# Patient Record
Sex: FEMALE | Race: WHITE | NOT HISPANIC OR LATINO | Employment: FULL TIME | URBAN - METROPOLITAN AREA
[De-identification: names, ages, dates, MRNs, and addresses within clinical notes are randomized per-mention and may not be internally consistent; named-entity substitution may affect disease eponyms.]

---

## 2023-02-24 PROBLEM — J67.9 HYPERSENSITIVITY PNEUMONITIS (MULTI): Status: ACTIVE | Noted: 2023-02-24

## 2023-02-24 PROBLEM — Z98.890 S/P LASIK (LASER ASSISTED IN SITU KERATOMILEUSIS): Status: ACTIVE | Noted: 2023-02-24

## 2023-02-24 PROBLEM — L71.9 BLEPHARITIS WITH ROSACEA: Status: ACTIVE | Noted: 2023-02-24

## 2023-02-24 PROBLEM — H53.71 GLARE SENSITIVITY: Status: ACTIVE | Noted: 2023-02-24

## 2023-02-24 PROBLEM — B02.33 HERPES ZOSTER KERATITIS OF RIGHT EYE: Status: ACTIVE | Noted: 2023-02-24

## 2023-02-24 PROBLEM — E66.3 OVERWEIGHT WITH BODY MASS INDEX (BMI) OF 27 TO 27.9 IN ADULT: Status: ACTIVE | Noted: 2023-02-24

## 2023-02-24 PROBLEM — R92.30 BREAST DENSITY: Status: ACTIVE | Noted: 2023-02-24

## 2023-02-24 PROBLEM — H01.009 BLEPHARITIS WITH ROSACEA: Status: ACTIVE | Noted: 2023-02-24

## 2023-02-24 RX ORDER — ESTRADIOL 10 UG/1
INSERT VAGINAL
COMMUNITY
Start: 2018-07-24 | End: 2024-01-30 | Stop reason: SDUPTHER

## 2023-02-24 RX ORDER — QUINIDINE GLUCONATE 324 MG
TABLET, EXTENDED RELEASE ORAL
COMMUNITY
Start: 2014-06-05

## 2023-02-24 RX ORDER — CALCIUM CARBONATE 500(1250)
TABLET ORAL
COMMUNITY
Start: 2014-06-05

## 2023-03-15 LAB
BASOPHILS (10*3/UL) IN BLOOD BY AUTOMATED COUNT: 0.05 X10E9/L (ref 0–0.1)
BASOPHILS/100 LEUKOCYTES IN BLOOD BY AUTOMATED COUNT: 0.7 % (ref 0–2)
CHOLESTEROL (MG/DL) IN SER/PLAS: 218 MG/DL (ref 0–199)
CHOLESTEROL IN HDL (MG/DL) IN SER/PLAS: 60.9 MG/DL
CHOLESTEROL/HDL RATIO: 3.6
EOSINOPHILS (10*3/UL) IN BLOOD BY AUTOMATED COUNT: 0.25 X10E9/L (ref 0–0.7)
EOSINOPHILS/100 LEUKOCYTES IN BLOOD BY AUTOMATED COUNT: 3.5 % (ref 0–6)
ERYTHROCYTE DISTRIBUTION WIDTH (RATIO) BY AUTOMATED COUNT: 12.8 % (ref 11.5–14.5)
ERYTHROCYTE MEAN CORPUSCULAR HEMOGLOBIN CONCENTRATION (G/DL) BY AUTOMATED: 33 G/DL (ref 32–36)
ERYTHROCYTE MEAN CORPUSCULAR VOLUME (FL) BY AUTOMATED COUNT: 95 FL (ref 80–100)
ERYTHROCYTES (10*6/UL) IN BLOOD BY AUTOMATED COUNT: 4.32 X10E12/L (ref 4–5.2)
HEMATOCRIT (%) IN BLOOD BY AUTOMATED COUNT: 41.2 % (ref 36–46)
HEMOGLOBIN (G/DL) IN BLOOD: 13.6 G/DL (ref 12–16)
IMMATURE GRANULOCYTES/100 LEUKOCYTES IN BLOOD BY AUTOMATED COUNT: 0.1 % (ref 0–0.9)
LDL: 143 MG/DL (ref 0–99)
LEUKOCYTES (10*3/UL) IN BLOOD BY AUTOMATED COUNT: 7.2 X10E9/L (ref 4.4–11.3)
LYMPHOCYTES (10*3/UL) IN BLOOD BY AUTOMATED COUNT: 2.85 X10E9/L (ref 1.2–4.8)
LYMPHOCYTES/100 LEUKOCYTES IN BLOOD BY AUTOMATED COUNT: 39.5 % (ref 13–44)
MONOCYTES (10*3/UL) IN BLOOD BY AUTOMATED COUNT: 0.44 X10E9/L (ref 0.1–1)
MONOCYTES/100 LEUKOCYTES IN BLOOD BY AUTOMATED COUNT: 6.1 % (ref 2–10)
NEUTROPHILS (10*3/UL) IN BLOOD BY AUTOMATED COUNT: 3.62 X10E9/L (ref 1.2–7.7)
NEUTROPHILS/100 LEUKOCYTES IN BLOOD BY AUTOMATED COUNT: 50.1 % (ref 40–80)
PLATELETS (10*3/UL) IN BLOOD AUTOMATED COUNT: 236 X10E9/L (ref 150–450)
TRIGLYCERIDE (MG/DL) IN SER/PLAS: 70 MG/DL (ref 0–149)
VLDL: 14 MG/DL (ref 0–40)

## 2023-03-17 ENCOUNTER — OFFICE VISIT (OUTPATIENT)
Dept: PRIMARY CARE | Facility: CLINIC | Age: 63
End: 2023-03-17
Payer: COMMERCIAL

## 2023-03-17 VITALS
TEMPERATURE: 97.9 F | RESPIRATION RATE: 16 BRPM | BODY MASS INDEX: 25.79 KG/M2 | HEART RATE: 70 BPM | WEIGHT: 141 LBS | SYSTOLIC BLOOD PRESSURE: 128 MMHG | OXYGEN SATURATION: 97 % | DIASTOLIC BLOOD PRESSURE: 80 MMHG

## 2023-03-17 DIAGNOSIS — E78.2 MIXED HYPERLIPIDEMIA: Primary | ICD-10-CM

## 2023-03-17 DIAGNOSIS — Z12.31 ENCOUNTER FOR SCREENING MAMMOGRAM FOR MALIGNANT NEOPLASM OF BREAST: ICD-10-CM

## 2023-03-17 PROCEDURE — 1036F TOBACCO NON-USER: CPT | Performed by: INTERNAL MEDICINE

## 2023-03-17 PROCEDURE — 99214 OFFICE O/P EST MOD 30 MIN: CPT | Performed by: INTERNAL MEDICINE

## 2023-03-17 RX ORDER — ATORVASTATIN CALCIUM 10 MG/1
10 TABLET, FILM COATED ORAL DAILY
Qty: 90 TABLET | Refills: 3 | Status: SHIPPED | OUTPATIENT
Start: 2023-03-17 | End: 2024-01-16 | Stop reason: SDUPTHER

## 2023-03-17 ASSESSMENT — ENCOUNTER SYMPTOMS
ABDOMINAL PAIN: 0
CONSTIPATION: 0
DIARRHEA: 0
SHORTNESS OF BREATH: 0

## 2023-03-17 ASSESSMENT — PATIENT HEALTH QUESTIONNAIRE - PHQ9
SUM OF ALL RESPONSES TO PHQ9 QUESTIONS 1 AND 2: 0
1. LITTLE INTEREST OR PLEASURE IN DOING THINGS: NOT AT ALL
2. FEELING DOWN, DEPRESSED OR HOPELESS: NOT AT ALL

## 2023-03-17 NOTE — PROGRESS NOTES
Patient here for a follow up    Subjective   Patient ID: Eli Matthew is a 62 y.o. female who presents for Follow-up.  She is generally doing well today.    The patient reports that there have been no issues with difficulty with breathing.  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  Her last CT Chest in 12/2022 showed interval resolution of previous hypersensitivity pneumonitis with marked improvement compared to previous imaging from 4/2022.  She continues to follow with Pulmonology.    The patient is concerned about her most recent lipid panel from 3/2023 which showed borderline high cholesterol and LDL.  She inquires about the efficacy of starting statin therapy in her case.  Her current ASCVD Risk is 3.7%.  She maintains a healthy diet rich in salads and limited in carbohydrates and salt.  She is planning to lose an additional 10 lbs to reach her goal weight through exercise and diet.  She denies any abdominal pain or bowel problems.    The patient completed her last Women's Wellness visit with  from Obstetrics/Gynecology and reports that she is doing well.    The patient is a practicing Pharmacist.        Review of Systems   Respiratory:  Negative for shortness of breath.    Gastrointestinal:  Negative for abdominal pain, constipation and diarrhea.       Objective   Physical Exam  Constitutional:       Appearance: Normal appearance.   Cardiovascular:      Rate and Rhythm: Normal rate and regular rhythm.      Heart sounds: Normal heart sounds.   Pulmonary:      Effort: Pulmonary effort is normal.      Breath sounds: Normal breath sounds.   Abdominal:      General: Bowel sounds are normal.      Palpations: Abdomen is soft.      Tenderness: There is no abdominal tenderness.   Skin:     General: Skin is warm and dry.   Neurological:      General: No focal deficit present.      Mental Status: She is alert and oriented to person, place, and time. Mental status is at  baseline.   Psychiatric:         Mood and Affect: Mood normal.         Behavior: Behavior normal.       Assessment/Plan       IMPRESSION/PLAN:     HLD   - Cholesterol and LDL borderline high per 3/2023.  ASCVD Risk 3.7%.  Start atorvastatin 10mg every day per the patient and discussed adverse effect profile.  Call the clinic for any myalgia or other adverse effects.  Will repeat lipid panel in 3 months.    BP at 128/80 today in office.         Hypersensitivity pneumonitis  - Last CT Chest 12/2022 showed interval resolution with marked improvement compared to previous imaging from 4/2022.  She is following with pulm (Dr. Ocampo) as well. On exam- lungs were clear.      Post-menopause   - Takes Estradiol 10 mcg vaginal tablet as directed. Follows with Dr. Hammond in Gynecology.      Upper Abdominal Pain   - Suspect MSK. Seems to be nearly resolved. Only when coughing. Nothing seen on CT scan I reviewed. Monitor this, call the office if it worsens.      Health Maintenance   - Routine labs 3/2023.  Last Mammogram 6/2022, ordered repeat for 6/2023.  Last colonoscopy 11/2022, repeat due 11/2029.     Follow up in 6 months, call sooner if needed.       Scribe Attestation  By signing my name below, I, Sarahi Rey   attest that this documentation has been prepared under the direction and in the presence of Juan Harris DO.

## 2023-08-25 ENCOUNTER — OFFICE VISIT (OUTPATIENT)
Dept: PRIMARY CARE | Facility: CLINIC | Age: 63
End: 2023-08-25
Payer: COMMERCIAL

## 2023-08-25 VITALS
TEMPERATURE: 97.4 F | WEIGHT: 144 LBS | RESPIRATION RATE: 16 BRPM | DIASTOLIC BLOOD PRESSURE: 80 MMHG | HEART RATE: 67 BPM | BODY MASS INDEX: 26.34 KG/M2 | SYSTOLIC BLOOD PRESSURE: 132 MMHG | OXYGEN SATURATION: 97 %

## 2023-08-25 DIAGNOSIS — E78.2 MIXED HYPERLIPIDEMIA: Primary | ICD-10-CM

## 2023-08-25 DIAGNOSIS — R73.9 HYPERGLYCEMIA: ICD-10-CM

## 2023-08-25 PROCEDURE — 99214 OFFICE O/P EST MOD 30 MIN: CPT | Performed by: INTERNAL MEDICINE

## 2023-08-25 PROCEDURE — 1036F TOBACCO NON-USER: CPT | Performed by: INTERNAL MEDICINE

## 2023-08-25 PROCEDURE — 3008F BODY MASS INDEX DOCD: CPT | Performed by: INTERNAL MEDICINE

## 2023-08-25 RX ORDER — SEMAGLUTIDE 0.25 MG/.5ML
0.25 INJECTION, SOLUTION SUBCUTANEOUS
Qty: 2 ML | Refills: 0 | Status: SHIPPED | OUTPATIENT
Start: 2023-08-25 | End: 2024-01-16 | Stop reason: ALTCHOICE

## 2023-08-25 ASSESSMENT — ENCOUNTER SYMPTOMS: MYALGIAS: 0

## 2023-08-25 NOTE — PROGRESS NOTES
Patient here for a 6 month follow up    Subjective   Patient ID: Eli Matthew is a 63 y.o. female who presents for Follow-up.  She is generally doing well today.    The patient has been taking atorvastatin 10mg once daily along with CoQ10 supplementation, and is tolerating the medication very well.  She denies any muscle cramping.  The patient notes that both she and her sisters have isolated chronic AST elevation as their baseline.    The patient is frustrated that she is unable to lose 15 lbs despite maintaining a healthy diet, and physical activity.  She inquires whether GLP-1 agonists would be helpful in her case, as she has already lost 26 lbs since 4/2023.  She denies any known family history of thyroid cancer.      The patient continues to follow with  from Pulmonary Medicine for a history of hypersensitivity pneumonitis, and notes that her condition is stable.  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      Review of Systems   Musculoskeletal:  Negative for myalgias.     Objective   Physical Exam  Constitutional:       Appearance: Normal appearance.   Neck:      Vascular: No carotid bruit.   Cardiovascular:      Rate and Rhythm: Normal rate and regular rhythm.      Heart sounds: Normal heart sounds.   Pulmonary:      Effort: Pulmonary effort is normal.      Breath sounds: Normal breath sounds.   Abdominal:      General: Bowel sounds are normal.      Palpations: Abdomen is soft.      Tenderness: There is no abdominal tenderness.   Skin:     General: Skin is warm and dry.   Neurological:      General: No focal deficit present.      Mental Status: She is alert and oriented to person, place, and time. Mental status is at baseline.   Psychiatric:         Mood and Affect: Mood normal.         Behavior: Behavior normal.       Assessment/Plan   Problem List Items Addressed This Visit    None  Visit Diagnoses       Mixed hyperlipidemia    -  Primary    Relevant Orders    Lipid  panel    Comprehensive metabolic panel    BMI 26.0-26.9,adult        Relevant Medications    semaglutide, weight loss, (Wegovy) 0.25 mg/0.5 mL pen injector    Hyperglycemia        Relevant Medications    semaglutide 0.25 mg or 0.5 mg (2 mg/3 mL) pen injector            IMPRESSION/PLAN:     BMI 26-26.9 / Hyperglycemia  -  Prescribed semaglutide 0.25mg/0.5ml as 0.25 mg once weekly.    BP at 132/80 today in office.     HLD   - Cholesterol and LDL borderline high per 3/2023.  Continue with atorvastatin 10 mg once daily.  ASCVD Risk 3.9% per 8/2023.  Ordered CMP and lipid panel to be completed in the fasting state.    Hypersensitivity pneumonitis  - Last CT Chest 12/2022 showed interval resolution with marked improvement compared to previous imaging from 4/2022.  She is following with pulm (Dr. Ocampo) as well. On exam- lungs were clear.      Post-menopause   - Takes Estradiol 10 mcg vaginal tablet as directed. Follows with Dr. Hammond in Gynecology.      Upper Abdominal Pain   - Suspect MSK. Seems to be nearly resolved. Only when coughing. Nothing seen on CT scan I reviewed. Monitor this, call the office if it worsens.      Health Maintenance   - Routine labs 3/2023.  Last Mammogram 6/2022, order for repeat in 2023 pending.  Last colonoscopy 11/2022, repeat due 11/2029.      Follow up in 6 months, call sooner if needed.       Scribe Attestation  By signing my name below, I, Amy Perkins, Scrwinifrede   attest that this documentation has been prepared under the direction and in the presence of Juan Harris DO.

## 2024-01-16 ENCOUNTER — ANCILLARY PROCEDURE (OUTPATIENT)
Dept: RADIOLOGY | Facility: CLINIC | Age: 64
End: 2024-01-16
Payer: COMMERCIAL

## 2024-01-16 ENCOUNTER — OFFICE VISIT (OUTPATIENT)
Dept: PRIMARY CARE | Facility: CLINIC | Age: 64
End: 2024-01-16
Payer: COMMERCIAL

## 2024-01-16 ENCOUNTER — LAB (OUTPATIENT)
Dept: LAB | Facility: LAB | Age: 64
End: 2024-01-16
Payer: COMMERCIAL

## 2024-01-16 VITALS
OXYGEN SATURATION: 97 % | WEIGHT: 140 LBS | RESPIRATION RATE: 16 BRPM | TEMPERATURE: 97.5 F | BODY MASS INDEX: 25.61 KG/M2 | SYSTOLIC BLOOD PRESSURE: 148 MMHG | DIASTOLIC BLOOD PRESSURE: 80 MMHG | HEART RATE: 71 BPM

## 2024-01-16 DIAGNOSIS — M25.562 LEFT KNEE PAIN, UNSPECIFIED CHRONICITY: Primary | ICD-10-CM

## 2024-01-16 DIAGNOSIS — M25.512 LEFT SHOULDER PAIN, UNSPECIFIED CHRONICITY: ICD-10-CM

## 2024-01-16 DIAGNOSIS — E78.2 MIXED HYPERLIPIDEMIA: ICD-10-CM

## 2024-01-16 DIAGNOSIS — M25.562 LEFT KNEE PAIN, UNSPECIFIED CHRONICITY: ICD-10-CM

## 2024-01-16 PROBLEM — J67.9 HYPERSENSITIVITY PNEUMONITIS (MULTI): Status: RESOLVED | Noted: 2023-02-24 | Resolved: 2024-01-16

## 2024-01-16 LAB
ALBUMIN SERPL BCP-MCNC: 4.6 G/DL (ref 3.4–5)
ALP SERPL-CCNC: 42 U/L (ref 33–136)
ALT SERPL W P-5'-P-CCNC: 25 U/L (ref 7–45)
ANION GAP SERPL CALC-SCNC: 12 MMOL/L (ref 10–20)
AST SERPL W P-5'-P-CCNC: 60 U/L (ref 9–39)
BILIRUB SERPL-MCNC: 0.5 MG/DL (ref 0–1.2)
BUN SERPL-MCNC: 14 MG/DL (ref 6–23)
CALCIUM SERPL-MCNC: 9.5 MG/DL (ref 8.6–10.3)
CHLORIDE SERPL-SCNC: 103 MMOL/L (ref 98–107)
CHOLEST SERPL-MCNC: 159 MG/DL (ref 0–199)
CHOLESTEROL/HDL RATIO: 2.6
CO2 SERPL-SCNC: 28 MMOL/L (ref 21–32)
CREAT SERPL-MCNC: 0.73 MG/DL (ref 0.5–1.05)
EGFRCR SERPLBLD CKD-EPI 2021: >90 ML/MIN/1.73M*2
GLUCOSE SERPL-MCNC: 96 MG/DL (ref 74–99)
HDLC SERPL-MCNC: 61.7 MG/DL
LDLC SERPL CALC-MCNC: 83 MG/DL
NON HDL CHOLESTEROL: 97 MG/DL (ref 0–149)
POTASSIUM SERPL-SCNC: 4.4 MMOL/L (ref 3.5–5.3)
PROT SERPL-MCNC: 7.3 G/DL (ref 6.4–8.2)
SODIUM SERPL-SCNC: 139 MMOL/L (ref 136–145)
TRIGL SERPL-MCNC: 71 MG/DL (ref 0–149)
VLDL: 14 MG/DL (ref 0–40)

## 2024-01-16 PROCEDURE — 99214 OFFICE O/P EST MOD 30 MIN: CPT | Performed by: INTERNAL MEDICINE

## 2024-01-16 PROCEDURE — 80061 LIPID PANEL: CPT

## 2024-01-16 PROCEDURE — 73562 X-RAY EXAM OF KNEE 3: CPT | Mod: LEFT SIDE | Performed by: RADIOLOGY

## 2024-01-16 PROCEDURE — 80053 COMPREHEN METABOLIC PANEL: CPT

## 2024-01-16 PROCEDURE — 36415 COLL VENOUS BLD VENIPUNCTURE: CPT

## 2024-01-16 PROCEDURE — 73562 X-RAY EXAM OF KNEE 3: CPT | Mod: LT

## 2024-01-16 PROCEDURE — 73030 X-RAY EXAM OF SHOULDER: CPT | Mod: LEFT SIDE | Performed by: RADIOLOGY

## 2024-01-16 PROCEDURE — 3008F BODY MASS INDEX DOCD: CPT | Performed by: INTERNAL MEDICINE

## 2024-01-16 PROCEDURE — 1036F TOBACCO NON-USER: CPT | Performed by: INTERNAL MEDICINE

## 2024-01-16 PROCEDURE — 73030 X-RAY EXAM OF SHOULDER: CPT | Mod: LT

## 2024-01-16 RX ORDER — MELOXICAM 15 MG/1
15 TABLET ORAL DAILY
Qty: 30 TABLET | Refills: 11 | Status: SHIPPED | OUTPATIENT
Start: 2024-01-16 | End: 2024-01-30 | Stop reason: WASHOUT

## 2024-01-16 RX ORDER — ATORVASTATIN CALCIUM 10 MG/1
10 TABLET, FILM COATED ORAL DAILY
Qty: 90 TABLET | Refills: 3 | Status: SHIPPED | OUTPATIENT
Start: 2024-01-16 | End: 2024-07-14

## 2024-01-16 ASSESSMENT — PATIENT HEALTH QUESTIONNAIRE - PHQ9
1. LITTLE INTEREST OR PLEASURE IN DOING THINGS: NOT AT ALL
2. FEELING DOWN, DEPRESSED OR HOPELESS: NOT AT ALL
SUM OF ALL RESPONSES TO PHQ9 QUESTIONS 1 AND 2: 0

## 2024-01-16 ASSESSMENT — ENCOUNTER SYMPTOMS: SHORTNESS OF BREATH: 0

## 2024-01-16 NOTE — PROGRESS NOTES
Patient here for left shoulder and knee pain    Subjective   Patient ID: Eli Matthew is a 63 y.o. female who presents for Shoulder Pain and Knee Pain.    The patient reports acute tearing left shoulder pain since late 11/2023 after pulling a wagon at her home.  The symptoms seem to have worsened in 12/2023, and has been having difficulty with washing her hair, putting on her coat, and fastening her seatbelt.  She finds that the pain is worse in the morning on waking, and has been taking Naproxen which is helping.  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    The patient mentions moderate to severe left knee pain, particularly on prolonged standing and night time.  She notes a history of ACL repair in the affected joint.      The patient has not been taking Wegovy due to issues with insurance coverage, but is pleased to report a weight loss nevertheless of 4 lbs since 8/2023 with dietary adjustment.    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    Shoulder Pain     Knee Pain       Review of Systems   Respiratory:  Negative for shortness of breath.    Musculoskeletal:         Positive for left shoulder and left knee pain.     Objective   Physical Exam  Constitutional:       Appearance: Normal appearance.   Neck:      Vascular: No carotid bruit.   Cardiovascular:      Rate and Rhythm: Normal rate and regular rhythm.      Heart sounds: Normal heart sounds.   Pulmonary:      Effort: Pulmonary effort is normal.      Breath sounds: Normal breath sounds.   Abdominal:      General: Bowel sounds are normal.      Palpations: Abdomen is soft.      Tenderness: There is no abdominal tenderness.   Skin:     General: Skin is warm and dry.   Neurological:      General: No focal deficit present.      Mental Status: She is alert and oriented to person, place, and time. Mental status is at baseline.   Psychiatric:          Mood and Affect: Mood normal.         Behavior: Behavior normal.       Assessment/Plan   Problem List Items Addressed This Visit    None  Visit Diagnoses         Codes    Left knee pain, unspecified chronicity    -  Primary M25.562    Relevant Orders    XR knee left 3 views    Referral to Physical Therapy    Left shoulder pain, unspecified chronicity     M25.512    Relevant Medications    meloxicam (Mobic) 15 mg tablet    Other Relevant Orders    XR shoulder left 2+ views    Referral to Physical Therapy    Mixed hyperlipidemia     E78.2    Relevant Medications    atorvastatin (Lipitor) 10 mg tablet            IMPRESSION/PLAN:      Acute Left Shoulder Pain  - Suspect partial rotator cuff tear, or tendinitis.  Prescribed meloxicam 15mg once daily prn, and advised to take with food to avoid adverse effects.   Ordered Xray left shoulder, and referral to Physical Therapy.  Call the clinic if symptoms persist or worsen, and will consider referral to  from Orthopedic Surgery.    Chronic Left Knee Pain  - Prescribed meloxicam 15mg once daily prn, and advised to take with food to avoid adverse effects.   Ordered Xray left knee, and referral to Physical Therapy.  Call the clinic if symptoms persist or worsen, and will consider referral to  from Orthopedic Surgery.    BP at 148/80 today in office.     HLD   - Cholesterol and LDL borderline high per 3/2023.  Continue with atorvastatin 10 mg once daily.  ASCVD Risk 5.4% per 8/2023.  Previously ordered CMP and lipid panel to be completed in the fasting state.     Hypersensitivity pneumonitis  - Last CT Chest 12/2022 showed interval resolution with marked improvement compared to previous imaging from 4/2022.  She is following with pulm (Dr. Ocampo) as well. On exam- lungs were clear.      Post-menopause   - Takes Estradiol 10 mcg vaginal tablet as directed. Follows with Dr. Hammond in Gynecology.      Upper Abdominal Pain   - Suspect MSK. Seems to be nearly  resolved. Only when coughing. Nothing seen on CT scan I reviewed. Monitor this, call the office if it worsens.      BMI 26-26.9 / Hyperglycemia  -  Previously prescribed semaglutide 0.25mg/0.5ml as 0.25 mg once weekly.  But unable to take due to issues with coverage.    Health Maintenance   - Routine labs 3/2023.  Last Mammogram 9/2023.  Last colonoscopy 11/2022, repeat due 11/2029.      Follow up in 6 months, call sooner if needed.       Scribe Attestation  By signing my name below, I, Sarahi Rye   attest that this documentation has been prepared under the direction and in the presence of Juan Harris DO.   Amy Perkins 01/16/24 9:36 AM

## 2024-01-19 DIAGNOSIS — R74.8 ELEVATED LIVER ENZYMES: Primary | ICD-10-CM

## 2024-01-22 ENCOUNTER — HOSPITAL ENCOUNTER (OUTPATIENT)
Dept: RADIOLOGY | Facility: HOSPITAL | Age: 64
Discharge: HOME | End: 2024-01-22
Payer: COMMERCIAL

## 2024-01-22 DIAGNOSIS — R74.8 ELEVATED LIVER ENZYMES: ICD-10-CM

## 2024-01-22 PROCEDURE — 76705 ECHO EXAM OF ABDOMEN: CPT

## 2024-01-22 PROCEDURE — 76705 ECHO EXAM OF ABDOMEN: CPT | Performed by: RADIOLOGY

## 2024-01-26 ENCOUNTER — EVALUATION (OUTPATIENT)
Dept: PHYSICAL THERAPY | Facility: CLINIC | Age: 64
End: 2024-01-26
Payer: COMMERCIAL

## 2024-01-26 DIAGNOSIS — M25.512 LEFT SHOULDER PAIN, UNSPECIFIED CHRONICITY: ICD-10-CM

## 2024-01-26 DIAGNOSIS — M25.512 LEFT SHOULDER PAIN: Primary | ICD-10-CM

## 2024-01-26 PROBLEM — M25.562 LEFT KNEE PAIN: Status: ACTIVE | Noted: 2024-01-26

## 2024-01-26 PROCEDURE — 97110 THERAPEUTIC EXERCISES: CPT | Mod: GP

## 2024-01-26 PROCEDURE — 97535 SELF CARE MNGMENT TRAINING: CPT | Mod: GP

## 2024-01-26 PROCEDURE — 97161 PT EVAL LOW COMPLEX 20 MIN: CPT | Mod: GP

## 2024-01-26 ASSESSMENT — PAIN SCALES - GENERAL: PAINLEVEL_OUTOF10: 4

## 2024-01-26 ASSESSMENT — PAIN - FUNCTIONAL ASSESSMENT: PAIN_FUNCTIONAL_ASSESSMENT: 0-10

## 2024-01-26 NOTE — PROGRESS NOTES
Physical Therapy Evaluation and Treatment      Patient Name: Eli Matthew  MRN: [id number]  Today's Date: 1/26/2024  Time Calculation  Start Time: 1007  Stop Time: 1049  Time Calculation (min): 42 min  Visit Number: 1  Approved Visits: 30  Auth required: no  Assessment:  PT Assessment  Rehab Prognosis: Good  Evaluation/Treatment Tolerance: Patient tolerated treatment well   Patient presents with chief complaint of L shoulder pain that has been present since late November 2023 and began insidiously around the same time she was doing a lot of driving and lifting for work. Patient notes that things are generally improved since initial onset, but reaching back, reaching behind her back, and sleeping on her L side tends to aggravate her sxs. Patient presents with signs and consistent with potential L RTC-related shoulder pain. Patient demonstrates decreased shoulder ROM, decreased  shoulder/scapular strength, impaired scapulohumeral rhythm, and increased shoulder pain, which limits her current functional ability.    Plan:  OP PT Plan  Treatment/Interventions: Blood flow restriction therapy, Cryotherapy, Education/ Instruction, Gait training, Manual therapy, Neuromuscular re-education, Self care/ home management, Taping techniques, Therapeutic activities, Therapeutic exercises, Vasopneumatic device  PT Plan: Skilled PT  PT Frequency: 1 time per week  Duration: 8-10 weeks  Onset Date: 11/27/23  Number of Treatments Authorized: 30  Rehab Potential: Good  Plan of Care Agreement: Patient    Current Problem:   1. Left shoulder pain  Follow Up In Physical Therapy      2. Left shoulder pain, unspecified chronicity  Referral to Physical Therapy          Subjective    General:  General  Reason for Referral: L shoulder pain  Referred By: Dr. Harris  Chief complaint: L shoulder and L knee pain that began late November 2023. Was driving a stick shift car and doing a lot of lifting/traveling for work and wonders if this aggravated  things. Reports that she has trouble reaching behind her back, reaching back, secondary to pain. Has been trying to protect her shoulder as much as she can and seems to be feeling better overall. 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 Pain is located along the lateral aspect of the shoulder. This really affects ADLs, such dressing and grooming. Was taking Meloxicam and did not seem to really help much. Does not really affect overhead movements. Laying on her L side can bother things and wake her up at night. Denies any radiation of sxs. Denies any sensation changes. Likes to walk and do yoga -- has been holding off on yoga since shoulder started bothering her. Patient is RHD. Denies any clicking/popping. Notes some knee pain that began around the same time, but seems better at this time. Imaging unremarkable  PMHx: Hx L ACL injury  : verified with patient  Social/Environmental Factors: Patient works as pharmacist.   PLOF: independent without restrictions  Medications: see chart for full medication list   Patient goals for PT: reduce pain,  Medical Screening: Patient denies recent infection/illness, headaches, chest pain, SOB,    Precautions:  Precautions  Precautions Comment: none; no increased fall risk  Pain:  Pain Assessment  Pain Assessment: 0-10  Pain Score: 4 (when she feels it)      Objective   Eval Objective:  Shoulder ROM:   Flexion: R:180 L:180   Abduction: R:180 L:170 slight (+) pain with OP   ER (functional): R:T3 L:T1   IR (functional): R:T6 L: T9  Shoulder PROM: WNL with no pain, except for slight (+) pain end range horizontal adduction  MMT:   Flexion: R:5/5 L:4+/5 (+)   Abduction: R:5/5 L:4+/5 (+)   ER (0*): R:5/5 L:4-/5 (+)   IR (0*): R:4/5 L:4/5   Lower trap: R:4+/5 L:4/5   Middle trap: R:4+/5 L:4/5  Special Tests (Shoulder):   Lateral Benedicto: (+)   Painful Arc: (-)   Drop Arm: (-)  Observation: L scapula slight anteriorly tilt  Slight shrug noted with resisted  abduction>flexion  Palpation: no ttp noted throughout shoulder  Joint Play Assessment: slight hypomobility noted L GHJ posterior glide    Outcome Measures:  Other Measures  Disability of Arm Shoulder Hand (DASH): 19=18.18     Treatments:  Therapeutic Exercise:  Supine band pull aparts GTB  Seated ER GTB  Seated scap retractions  Scapular wall slides    Self-Care Home Management:  Review ER isometrics against wall for pain relief  Edu benefits of graded strength-based program for tendon-related pathology  Edu hurt vs harm and appropriate levels of discomfort with exercise    EDUCATION:  Outpatient Education  Individual(s) Educated: Patient  Education Provided: Home Exercise Program  Risk and Benefits Discussed with Patient/Caregiver/Other: yes  Patient/Caregiver Demonstrated Understanding: yes  Plan of Care Discussed and Agreed Upon: yes  Patient Response to Education: Patient/Caregiver Verbalized Understanding of Information  Education Comment: review HEP    Goals:  Patient will demonstrate improvement in QuickDASH score to 0 in order to return to PLOF  Patient will demonstrate full shoulder AROM without pain  Patient will demonstrate at least 4+/5 strength shoulder and scapular musculature in all planes without pain  Patient will be able to return to work activities without pain or compensation  Patient will be able to return to yoga activities without pain or compensation  Patient will be able to complete ADLs, such as dressing and grooming without pain or compensation  Patient will be able to lay on her L side without pain  Patient will demonstrate good scapulohumeral rhythm with overhead shoulder motion  Patient will demonstrate I with HEP

## 2024-01-26 NOTE — PROGRESS NOTES
Eli ZAVALA Arbensal is a 63 y.o. G 0  Patient is a pharmacist  They have a second home in [place] and are planning to retire there    Last Gyn Visit: 12/27/2022  Last pap: 7/30/2019 unremarkable with cotesting (today)  Mammogram: 9/15/2023 unremarkable  Colonoscopy: 11/18/2022 unremarkable    2014 postmenopausal without bleeding  Vagifem for dyspareunia        General:   Alert and oriented, in no acute distress   Neck: Supple. No visible thyromegaly.    Breast/Axilla: Normal to palpation bilaterally without masses, skin changes, or nipple discharge.    Abdomen: Soft, non-tender, without masses or organomegaly   Vulva: Normal architecture without erythema, masses, or lesions.    Vagina: Normal mucosa without lesions, masses, or atrophy. No abnormal vaginal discharge.    Cervix: Normal without masses, lesions, or signs of cervicitis.    Uterus: Normal mobile, non-enlarged uterus    Adnexa: Normal without masses or lesions   Pelvic Floor No POP noted. No high tone pelvic floor    Psych Normal affect. Normal mood.        Assessment and Plan:  Postmenopausal without bleeding  Renew Vagifem for dyspareunia and vaginal atrophy  Pap with cotesting today  Mammogram and colonoscopy screening up-to-date

## 2024-01-30 ENCOUNTER — OFFICE VISIT (OUTPATIENT)
Dept: OBSTETRICS AND GYNECOLOGY | Facility: CLINIC | Age: 64
End: 2024-01-30
Payer: COMMERCIAL

## 2024-01-30 VITALS
WEIGHT: 135 LBS | DIASTOLIC BLOOD PRESSURE: 64 MMHG | BODY MASS INDEX: 24.84 KG/M2 | SYSTOLIC BLOOD PRESSURE: 112 MMHG | HEIGHT: 62 IN

## 2024-01-30 DIAGNOSIS — Z01.419 WELL WOMAN EXAM WITH ROUTINE GYNECOLOGICAL EXAM: Primary | ICD-10-CM

## 2024-01-30 DIAGNOSIS — N95.2 VAGINAL ATROPHY: ICD-10-CM

## 2024-01-30 DIAGNOSIS — Z12.31 SCREENING MAMMOGRAM FOR BREAST CANCER: ICD-10-CM

## 2024-01-30 DIAGNOSIS — Z12.4 CERVICAL CANCER SCREENING: ICD-10-CM

## 2024-01-30 PROCEDURE — 3008F BODY MASS INDEX DOCD: CPT | Performed by: OBSTETRICS & GYNECOLOGY

## 2024-01-30 PROCEDURE — 87624 HPV HI-RISK TYP POOLED RSLT: CPT

## 2024-01-30 PROCEDURE — 99396 PREV VISIT EST AGE 40-64: CPT | Performed by: OBSTETRICS & GYNECOLOGY

## 2024-01-30 PROCEDURE — 88175 CYTOPATH C/V AUTO FLUID REDO: CPT

## 2024-01-30 PROCEDURE — 1036F TOBACCO NON-USER: CPT | Performed by: OBSTETRICS & GYNECOLOGY

## 2024-01-30 RX ORDER — ESTRADIOL 10 UG/1
10 INSERT VAGINAL 2 TIMES WEEKLY
Qty: 18 TABLET | Refills: 2 | Status: SHIPPED | OUTPATIENT
Start: 2024-02-01 | End: 2025-01-31

## 2024-01-30 ASSESSMENT — PAIN SCALES - GENERAL: PAINLEVEL: 0-NO PAIN

## 2024-02-16 LAB
CYTOLOGY CMNT CVX/VAG CYTO-IMP: NORMAL
HPV HR 12 DNA GENITAL QL NAA+PROBE: NEGATIVE
HPV HR GENOTYPES PNL CVX NAA+PROBE: NEGATIVE
HPV16 DNA SPEC QL NAA+PROBE: NEGATIVE
HPV18 DNA SPEC QL NAA+PROBE: NEGATIVE
LAB AP HPV GENOTYPE QUESTION: YES
LAB AP HPV HR: NORMAL
LABORATORY COMMENT REPORT: NORMAL
PATH REPORT.TOTAL CANCER: NORMAL

## 2024-02-22 ENCOUNTER — TELEPHONE (OUTPATIENT)
Dept: PRIMARY CARE | Facility: CLINIC | Age: 64
End: 2024-02-22
Payer: COMMERCIAL

## 2024-02-22 DIAGNOSIS — M25.50 ARTHRALGIA, UNSPECIFIED JOINT: Primary | ICD-10-CM

## 2024-02-22 RX ORDER — MELOXICAM 15 MG/1
15 TABLET ORAL DAILY PRN
Qty: 30 TABLET | Refills: 3 | Status: SHIPPED | OUTPATIENT
Start: 2024-02-22 | End: 2025-02-21

## 2024-02-22 NOTE — TELEPHONE ENCOUNTER
Patient would like a refill on her mobic 15mg - I don't see this on her past or current medication list

## 2024-02-23 ENCOUNTER — APPOINTMENT (OUTPATIENT)
Dept: PHYSICAL THERAPY | Facility: CLINIC | Age: 64
End: 2024-02-23
Payer: COMMERCIAL

## 2024-02-26 ENCOUNTER — APPOINTMENT (OUTPATIENT)
Dept: PRIMARY CARE | Facility: CLINIC | Age: 64
End: 2024-02-26
Payer: COMMERCIAL

## 2024-06-14 ENCOUNTER — APPOINTMENT (OUTPATIENT)
Dept: PRIMARY CARE | Facility: CLINIC | Age: 64
End: 2024-06-14
Payer: COMMERCIAL

## 2024-06-14 VITALS
RESPIRATION RATE: 16 BRPM | OXYGEN SATURATION: 100 % | TEMPERATURE: 97.5 F | BODY MASS INDEX: 22.68 KG/M2 | HEART RATE: 71 BPM | WEIGHT: 124 LBS | SYSTOLIC BLOOD PRESSURE: 126 MMHG | DIASTOLIC BLOOD PRESSURE: 70 MMHG

## 2024-06-14 DIAGNOSIS — Z00.00 HEALTHCARE MAINTENANCE: Primary | ICD-10-CM

## 2024-06-14 DIAGNOSIS — G89.29 CHRONIC LEFT SHOULDER PAIN: ICD-10-CM

## 2024-06-14 DIAGNOSIS — M25.512 CHRONIC LEFT SHOULDER PAIN: ICD-10-CM

## 2024-06-14 PROCEDURE — 3008F BODY MASS INDEX DOCD: CPT | Performed by: INTERNAL MEDICINE

## 2024-06-14 PROCEDURE — 1036F TOBACCO NON-USER: CPT | Performed by: INTERNAL MEDICINE

## 2024-06-14 PROCEDURE — 99214 OFFICE O/P EST MOD 30 MIN: CPT | Performed by: INTERNAL MEDICINE

## 2024-06-14 ASSESSMENT — ENCOUNTER SYMPTOMS
SHORTNESS OF BREATH: 0
BACK PAIN: 1
BLOOD IN STOOL: 0
DIARRHEA: 0
ABDOMINAL PAIN: 0
TROUBLE SWALLOWING: 0
CONSTIPATION: 0

## 2024-06-14 ASSESSMENT — PATIENT HEALTH QUESTIONNAIRE - PHQ9
1. LITTLE INTEREST OR PLEASURE IN DOING THINGS: NOT AT ALL
SUM OF ALL RESPONSES TO PHQ9 QUESTIONS 1 AND 2: 0
2. FEELING DOWN, DEPRESSED OR HOPELESS: NOT AT ALL

## 2024-06-14 NOTE — PROGRESS NOTES
Patient here for a follow up    Subjective   Patient ID: Eli Matthew is a 64 y.o. female who presents for Follow-up.    The patient is pleased to report a weight loss of 11 lbs since 1/2024 which she attributes to healthy dietary changes and cutting down on alcohol intake.  She denies any dysphagia, heartburn, abdominal pain, hematochezia, melena, or bowel problems.      The patient reports that her Sisters and many of her family members have elevated AST levels.  The last CMP showed an elevated AST of 60, which is stable for her.  The patient also underwent an abdominal ultrasound which showed mild hepatic steatosis in 1/2024.    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  This is limiting some abilities including showering and changing clothes.  She denies any dyspnea. The patient has been attending massage therapy sessions, and finds this is helping.     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        Review of Systems   HENT:  Negative for trouble swallowing.    Respiratory:  Negative for shortness of breath.    Gastrointestinal:  Negative for abdominal pain, blood in stool, constipation and diarrhea.   Musculoskeletal:  Positive for back pain.        Positive for shoulder pain.       Objective   Physical Exam  Constitutional:       Appearance: Normal appearance.   Neck:      Vascular: No carotid bruit.   Cardiovascular:      Rate and Rhythm: Normal rate and regular rhythm.      Heart sounds: Normal heart sounds.   Pulmonary:      Effort: Pulmonary effort is normal.      Breath sounds: Normal breath sounds.   Abdominal:      General: Bowel sounds are normal.      Palpations: Abdomen is soft.      Tenderness: There is no abdominal tenderness.   Skin:     General: Skin is warm and dry.   Neurological:      General: No focal deficit present.      Mental Status:  She is alert and oriented to person, place, and time. Mental status is at baseline.   Psychiatric:         Mood and Affect: Mood normal.         Behavior: Behavior normal.         Assessment/Plan   Problem List Items Addressed This Visit             ICD-10-CM    Left shoulder pain M25.512    Relevant Orders    Referral to Orthopaedic Surgery     Other Visit Diagnoses         Codes    Healthcare maintenance    -  Primary Z00.00    Relevant Orders    CBC and Auto Differential    Comprehensive metabolic panel    Lipid panel    Tsh With Reflex To Free T4 If Abnormal            IMPRESSION/PLAN:       Acute on Chronic Left Shoulder Pain  - Prescribed meloxicam 15mg once daily prn, and advised to take with food to avoid adverse effects.   Xray left shoulder 1/2024 unremarkable.  Previously ordered referral to Physical Therapy.  Ordered referral to  from Orthopedic Surgery.  Call the clinic if symptoms persist or worsen.    BP at 126/70 today in office.     HLD   - Cholesterol and LDL borderline high per 3/2023.  Continue with atorvastatin 10 mg once daily.  ASCVD Risk 5.4% per 8/2023.  Previously ordered CMP and lipid panel to be completed in the fasting state.     Hypersensitivity pneumonitis  - Last CT Chest 12/2022 showed interval resolution with marked improvement compared to previous imaging from 4/2022.  She is following with pulm (Dr. Ocampo) as well. On exam- lungs were clear.      Post-menopause   - Takes Estradiol 10 mcg vaginal tablet as directed. Follows with Dr. Hammond in Gynecology.       Chronic Left Knee Pain  - Prescribed meloxicam 15mg once daily prn, and advised to take with food to avoid adverse effects.   Xray left knee 1/2024 unremarkable.  Previously ordered referral to Physical Therapy.  Call the clinic if symptoms persist or worsen, and will consider referral to  from Orthopedic Surgery.    Weight Loss  -  Patient lost weight on her own with diet and active lifestyle.  Advised to  call the clinic if she continues to lose weight.  Previously prescribed semaglutide 0.25mg/0.5ml as 0.25 mg once weekly.  But unable to take due to issues with coverage.     Health Maintenance   - Routine labs ordered including CBC, CMP, and a lipid panel to be completed in the fasting state. Added TSH. Last Pap 1/2024.  Last Mammogram 9/2023.  Last colonoscopy 11/2022, repeat due 11/2029.      Follow up in 6 months, call sooner if needed.       Scribe Attestation  By signing my name below, I, Sarahi Rey   attest that this documentation has been prepared under the direction and in the presence of Juan Harris DO.   Amy Perkins 06/14/24 8:33 AM

## 2024-06-25 ENCOUNTER — OFFICE VISIT (OUTPATIENT)
Dept: ORTHOPEDIC SURGERY | Facility: CLINIC | Age: 64
End: 2024-06-25
Payer: COMMERCIAL

## 2024-06-25 DIAGNOSIS — M25.512 LEFT SHOULDER PAIN, UNSPECIFIED CHRONICITY: ICD-10-CM

## 2024-06-25 PROCEDURE — 1036F TOBACCO NON-USER: CPT | Performed by: ORTHOPAEDIC SURGERY

## 2024-06-25 PROCEDURE — 99204 OFFICE O/P NEW MOD 45 MIN: CPT | Performed by: ORTHOPAEDIC SURGERY

## 2024-06-25 PROCEDURE — 2500000005 HC RX 250 GENERAL PHARMACY W/O HCPCS: Performed by: ORTHOPAEDIC SURGERY

## 2024-06-25 PROCEDURE — 2500000004 HC RX 250 GENERAL PHARMACY W/ HCPCS (ALT 636 FOR OP/ED): Performed by: ORTHOPAEDIC SURGERY

## 2024-06-25 PROCEDURE — 20610 DRAIN/INJ JOINT/BURSA W/O US: CPT | Mod: LT | Performed by: ORTHOPAEDIC SURGERY

## 2024-06-25 PROCEDURE — 99213 OFFICE O/P EST LOW 20 MIN: CPT | Performed by: ORTHOPAEDIC SURGERY

## 2024-06-25 PROCEDURE — 3008F BODY MASS INDEX DOCD: CPT | Performed by: ORTHOPAEDIC SURGERY

## 2024-06-25 RX ORDER — TRIAMCINOLONE ACETONIDE 40 MG/ML
40 INJECTION, SUSPENSION INTRA-ARTICULAR; INTRAMUSCULAR
Status: COMPLETED | OUTPATIENT
Start: 2024-06-25 | End: 2024-06-25

## 2024-06-25 RX ORDER — LIDOCAINE HYDROCHLORIDE 10 MG/ML
2 INJECTION INFILTRATION; PERINEURAL
Status: COMPLETED | OUTPATIENT
Start: 2024-06-25 | End: 2024-06-25

## 2024-06-25 NOTE — PROGRESS NOTES
History of Present Illness:   Patient presents today endorsing left shoulder pain.  The pain is worse with overhead activity and tends to wake the patient at night.  The patient denies a traumatic injury.   The pain is sharp in nature, localizes lateral and deep.  Better with rest.    She been having pain for approximately 9 months.  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     Past Surgical History:   Procedure Laterality Date    OTHER SURGICAL HISTORY  03/31/2014    Endometrial Biopsy By Hysteroscopy    REFRACTIVE SURGERY  12/01/2014    Corneal LASIK       Current Outpatient Medications:     atorvastatin (Lipitor) 10 mg tablet, Take 1 tablet (10 mg) by mouth once daily., Disp: 90 tablet, Rfl: 3    calcium 500 mg calcium (1,250 mg) tablet, Take by mouth., Disp: , Rfl:     estradiol (Vagifem) 10 mcg tablet vaginal tablet, Insert 1 tablet (10 mcg) into the vagina 2 times a week., Disp: 18 tablet, Rfl: 2    fish oil concentrate (Omega-3) 120-180 mg capsule, Take by mouth., Disp: , Rfl:     glucosamine/chondroitin/C/Moise (glucosamine-chondroit-vit C-Mn) 893-304-86-5 mg tablet, Take by mouth., Disp: , Rfl:     meloxicam (Mobic) 15 mg tablet, Take 1 tablet (15 mg) by mouth once daily as needed for mild pain (1 - 3)., Disp: 30 tablet, Rfl: 3    multivitamin with minerals (MULTIPLE VITAMIN-MINERALS ORAL), Take by mouth., Disp: , Rfl:     Review of Systems   GENERAL: Negative for malaise, significant weight loss, fever  MUSCULOSKELETAL: see HPI  NEURO:  Negative    Physical Examination:  Left  Shoulder:  Skin healthy to gross inspection  No ecchymosis, no swelling, no gross atrophy  No tenderness to palpation over acromioclavicular joint  No tenderness to palpation over biceps tendon  No tenderness to palpation over the cervical spine     ROM: normal  Strength:  4/5 Resisted elevation, 4+/5 Resisted external rotation     Pain with lift off and Speeds test   Negative Spurling´s test  Positive Neer and Hawkin´s test  Neurovascular exam normal distally    Imaging:  Radiographs demonstrate healthy joint spaces with no evidence of significant degenerative changes or fractures    Assessment:  Patient with left shoulder pain concern for a full thickness rotator cuff tear      Plan:  We discussed our presumptive diagnosis with the patient.  Based on the physical exam and symptoms we have a high clinical suspicion for a rotator cuff tear.  We reviewed the role of imaging, physical therapy, injections and the time frame to surgery and correlation with outcome.  The patient elected for: MRI, corticosteroid injection    L Inj/Asp: L subacromial bursa on 6/25/2024 4:39 PM  Indications: pain  Details: 22 G needle, posterior approach  Medications: 2 mL lidocaine 10 mg/mL (1 %); 40 mg triamcinolone acetonide 40 mg/mL  Outcome: tolerated well, no immediate complications  Procedure, treatment alternatives, risks and benefits explained, specific risks discussed. Consent was given by the patient. Immediately prior to procedure a time out was called to verify the correct patient, procedure, equipment, support staff and site/side marked as required. Patient was prepped and draped in the usual sterile fashion.

## 2024-07-24 ENCOUNTER — HOSPITAL ENCOUNTER (OUTPATIENT)
Dept: RADIOLOGY | Facility: CLINIC | Age: 64
Discharge: HOME | End: 2024-07-24
Payer: COMMERCIAL

## 2024-07-24 DIAGNOSIS — M25.512 LEFT SHOULDER PAIN, UNSPECIFIED CHRONICITY: ICD-10-CM

## 2024-07-24 PROCEDURE — 73221 MRI JOINT UPR EXTREM W/O DYE: CPT | Mod: LEFT SIDE | Performed by: RADIOLOGY

## 2024-07-24 PROCEDURE — 73221 MRI JOINT UPR EXTREM W/O DYE: CPT | Mod: LT

## 2024-08-01 ENCOUNTER — HOSPITAL ENCOUNTER (OUTPATIENT)
Dept: RADIOLOGY | Facility: CLINIC | Age: 64
Discharge: HOME | End: 2024-08-01
Payer: COMMERCIAL

## 2024-08-01 VITALS — WEIGHT: 123.9 LBS | HEIGHT: 62 IN | BODY MASS INDEX: 22.8 KG/M2

## 2024-08-01 DIAGNOSIS — Z12.31 SCREENING MAMMOGRAM FOR BREAST CANCER: ICD-10-CM

## 2024-08-01 PROCEDURE — 77067 SCR MAMMO BI INCL CAD: CPT

## 2024-09-12 ENCOUNTER — PATIENT MESSAGE (OUTPATIENT)
Dept: PRIMARY CARE | Facility: CLINIC | Age: 64
End: 2024-09-12
Payer: COMMERCIAL

## 2024-09-14 DIAGNOSIS — E78.2 MIXED HYPERLIPIDEMIA: Primary | ICD-10-CM

## 2024-09-23 ENCOUNTER — OFFICE VISIT (OUTPATIENT)
Dept: ORTHOPEDIC SURGERY | Facility: CLINIC | Age: 64
End: 2024-09-23
Payer: COMMERCIAL

## 2024-09-23 DIAGNOSIS — M25.512 LEFT SHOULDER PAIN, UNSPECIFIED CHRONICITY: Primary | ICD-10-CM

## 2024-09-23 DIAGNOSIS — M75.102 LEFT ROTATOR CUFF TEAR: ICD-10-CM

## 2024-09-23 PROCEDURE — 1036F TOBACCO NON-USER: CPT | Performed by: ORTHOPAEDIC SURGERY

## 2024-09-23 PROCEDURE — 99214 OFFICE O/P EST MOD 30 MIN: CPT | Performed by: ORTHOPAEDIC SURGERY

## 2024-09-23 PROCEDURE — 99214 OFFICE O/P EST MOD 30 MIN: CPT | Mod: 57 | Performed by: ORTHOPAEDIC SURGERY

## 2024-09-23 NOTE — H&P
History Of Present Illness  Eli Matthew is a 64 y.o. female presenting with left shoulder pain and weakness.     Past Medical History  She has a past medical history of Body mass index (BMI) 27.0-27.9, adult (08/05/2022), Personal history of other infectious and parasitic diseases (09/08/2014), and Zoster keratitis (06/02/2014).    Surgical History  She has a past surgical history that includes Other surgical history (03/31/2014) and Refractive surgery (12/01/2014).     Social History  She reports that she has never smoked. She has never used smokeless tobacco. She reports current alcohol use. She reports that she does not use drugs.    Family History  Family History   Problem Relation Name Age of Onset    Hypertension Mother      Cataracts Mother      Cataracts Father      Hypertension Father      Breast cancer Maternal Grandmother  85    Diabetes Other Grandfather     Breast cancer Maternal Cousin          Allergies  Sulfa (sulfonamide antibiotics)    Review of Systems CONSTITUTIONAL: Denies weight loss, fever and chills.    - HEENT: Denies changes in vision and hearing.    - RESPIRATORY: Denies SOB and cough.    - CV: Denies palpitations and CP.    - GI: Denies abdominal pain, nausea, vomiting and diarrhea.    - : Denies dysuria and urinary frequency.    - MSK: See physical exam findings     - SKIN: Denies rash and pruritus.    - NEUROLOGICAL: Denies headache and syncope.    - PSYCHIATRIC: Denies recent changes in mood. Denies anxiety and depression.     Physical Exam- GENERAL: Alert and oriented x 3. No acute distress. Well-nourished.    - EYES: EOMI. Anicteric.    - HENT: Moist mucous membranes. No scleral icterus. No cervical lymphadenopathy.    - LUNGS: Clear to auscultation bilaterally. No accessory muscle use.    - CARDIOVASCULAR: Regular rate and rhythm. No murmur. No JVD.    - ABDOMEN: Soft, non-tender and non-distended. No palpable masses.    - EXTREMITIES: Weakness to resisted rotator cuff  strength testing.    - NEUROLOGIC: No focal neurological deficits. CN II-XII grossly intact, but not individually tested.    - PSYCHIATRIC: Cooperative. Appropriate mood and affect.     Last Recorded Vitals  There were no vitals taken for this visit.    Relevant Results      Scheduled medications    Continuous medications    PRN medications    No results found for this or any previous visit (from the past 24 hour(s)).    Assessment/Plan   There are no diagnoses linked to this encounter.    Discussed arthroscopic left shoulder rotator cuff repair, patient agreeable like to proceed.       I spent 10 minutes in the professional and overall care of this patient.      Han Ortez PA-C

## 2024-09-23 NOTE — PROGRESS NOTES
History of Present Illness:  Patient presents with a known left rotator cuff tear.  The pain is worse with overhead activity and tends to wake the patient at night.  The patient denies a traumatic injury recently, however about a year ago she was lifting something heavy into the back of her truck and felt a sharp pain and pop in the left shoulder.  The patient has tried the following treatments rest, ice, anti-inflammatories as well as recent subacromial bursa injection on 6/25/2024 that helped for about 8 weeks.  The pain is sharp in nature, localizes lateral and deep.  Better with rest.  She is here today to discuss options for surgery as she understands she is trending towards this.    Review of Systems   GENERAL: Negative for malaise, significant weight loss, fever  MUSCULOSKELETAL: see HPI  NEURO:  Negative    Physical Examination:  Left Shoulder:  Skin healthy to gross inspection  No ecchymosis, no swelling, no gross atrophy  No tenderness to palpation over acromioclavicular joint  No tenderness to palpation over biceps tendon  No tenderness to palpation over the cervical spine     ROM: tolerates passive ROM  Strength:  4/5 Resisted elevation, 4+/5 Resisted external rotation     Pain with lift off and Speeds test   Negative Spurling´s test  Positive Neer and Hawkin´s test  Neurovascular exam normal distally    Imaging:  MRI: 1. Low-grade partial bursal surface tear of the supraspinatus tendon  with mild supraspinatus tendinosis.  2. Mild glenohumeral and acromioclavicular joint degenerative changes.  3. Mild subacromial/subdeltoid bursitis.    Assessment:  Patient with left rotator cuff tear      Plan:  We again reviewed rotator cuff tears.  We discussed non-operative treatment and concern for atrophy, weakness and possible progression to cuff arthropathy.    We discussed surgical intervention and rotator cuff repair, including associated interventions at the time of surgery.      The risk of failure to  heal/re-rupture was reviewed and the role of the size of the tear and timing of intervention in regards to the outcome.     Shoulder arthroscopy was discussed including the risks (stiffness, infection, medical complication, etc.) and timeframe for recovery.      We discussed the importance of formal physical therapy and strict adherence to a home exercise program.      Patient agreeable to further discussion regarding left shoulder arthroscopy rotator cuff repair.    Han Ortez PA-C      In a face to face encounter, I evaluated the patient and performed a physical examination, discussed pertinent diagnostic studies if indicated and discussed diagnosis and management strategies with both the patient and physician assistant / nurse practitioner.  I reviewed the PA/NP's note and agree with the documented findings and plan of care.    Patient with bursal sided tearing of the rotator cuff secondary to a type II acromion.  We reviewed surgical intervention with the patient.  She was amenable to proceeding based on chronicity to pain.    Viraj Robles MD

## 2024-09-27 ENCOUNTER — HOSPITAL ENCOUNTER (OUTPATIENT)
Dept: RADIOLOGY | Facility: HOSPITAL | Age: 64
Discharge: HOME | End: 2024-09-27
Payer: COMMERCIAL

## 2024-09-27 DIAGNOSIS — E78.2 MIXED HYPERLIPIDEMIA: ICD-10-CM

## 2024-09-27 PROCEDURE — 75571 CT HRT W/O DYE W/CA TEST: CPT

## 2024-10-07 ENCOUNTER — PATIENT MESSAGE (OUTPATIENT)
Dept: PRIMARY CARE | Facility: CLINIC | Age: 64
End: 2024-10-07
Payer: COMMERCIAL

## 2024-10-07 DIAGNOSIS — N95.2 VAGINAL ATROPHY: ICD-10-CM

## 2024-10-07 DIAGNOSIS — E78.2 MIXED HYPERLIPIDEMIA: Primary | ICD-10-CM

## 2024-10-08 RX ORDER — ESTRADIOL 10 UG/1
10 INSERT VAGINAL 2 TIMES WEEKLY
Qty: 24 TABLET | Refills: 3 | Status: SHIPPED | OUTPATIENT
Start: 2024-10-10

## 2024-10-17 PROBLEM — R45.89 EMOTIONAL SENSITIVITY: Status: ACTIVE | Noted: 2024-10-17

## 2024-10-17 PROBLEM — L81.4 OTHER MELANIN HYPERPIGMENTATION: Status: ACTIVE | Noted: 2018-07-19

## 2024-10-17 PROBLEM — L82.1 OTHER SEBORRHEIC KERATOSIS: Status: ACTIVE | Noted: 2018-07-19

## 2024-10-17 PROBLEM — D22.5 MELANOCYTIC NEVI OF TRUNK: Status: ACTIVE | Noted: 2018-07-19

## 2024-10-17 PROBLEM — D48.5 NEOPLASM OF UNCERTAIN BEHAVIOR OF SKIN: Status: ACTIVE | Noted: 2018-07-19

## 2024-10-17 PROBLEM — J20.9 ACUTE BRONCHITIS WITH BRONCHOSPASM: Status: ACTIVE | Noted: 2024-10-17

## 2024-10-17 PROBLEM — Z86.19 HISTORY OF HERPES ZOSTER: Status: ACTIVE | Noted: 2024-10-17

## 2024-10-17 PROBLEM — R06.02 SHORTNESS OF BREATH AT REST: Status: ACTIVE | Noted: 2022-04-25

## 2024-10-21 ENCOUNTER — LAB (OUTPATIENT)
Dept: LAB | Facility: LAB | Age: 64
End: 2024-10-21
Payer: COMMERCIAL

## 2024-10-21 DIAGNOSIS — Z00.00 HEALTHCARE MAINTENANCE: ICD-10-CM

## 2024-10-21 LAB
ALBUMIN SERPL BCP-MCNC: 4.2 G/DL (ref 3.4–5)
ALP SERPL-CCNC: 47 U/L (ref 33–136)
ALT SERPL W P-5'-P-CCNC: 27 U/L (ref 7–45)
ANION GAP SERPL CALC-SCNC: 10 MMOL/L (ref 10–20)
AST SERPL W P-5'-P-CCNC: 62 U/L (ref 9–39)
BASOPHILS # BLD AUTO: 0.03 X10*3/UL (ref 0–0.1)
BASOPHILS NFR BLD AUTO: 0.4 %
BILIRUB SERPL-MCNC: 0.6 MG/DL (ref 0–1.2)
BUN SERPL-MCNC: 13 MG/DL (ref 6–23)
CALCIUM SERPL-MCNC: 9.6 MG/DL (ref 8.6–10.6)
CHLORIDE SERPL-SCNC: 106 MMOL/L (ref 98–107)
CHOLEST SERPL-MCNC: 150 MG/DL (ref 0–199)
CHOLESTEROL/HDL RATIO: 2.5
CO2 SERPL-SCNC: 31 MMOL/L (ref 21–32)
CREAT SERPL-MCNC: 0.83 MG/DL (ref 0.5–1.05)
EGFRCR SERPLBLD CKD-EPI 2021: 79 ML/MIN/1.73M*2
EOSINOPHIL # BLD AUTO: 0.13 X10*3/UL (ref 0–0.7)
EOSINOPHIL NFR BLD AUTO: 1.7 %
ERYTHROCYTE [DISTWIDTH] IN BLOOD BY AUTOMATED COUNT: 12.3 % (ref 11.5–14.5)
GLUCOSE SERPL-MCNC: 98 MG/DL (ref 74–99)
HCT VFR BLD AUTO: 38.2 % (ref 36–46)
HDLC SERPL-MCNC: 61.1 MG/DL
HGB BLD-MCNC: 12.7 G/DL (ref 12–16)
IMM GRANULOCYTES # BLD AUTO: 0.03 X10*3/UL (ref 0–0.7)
IMM GRANULOCYTES NFR BLD AUTO: 0.4 % (ref 0–0.9)
LDLC SERPL CALC-MCNC: 77 MG/DL
LYMPHOCYTES # BLD AUTO: 2.87 X10*3/UL (ref 1.2–4.8)
LYMPHOCYTES NFR BLD AUTO: 37.3 %
MCH RBC QN AUTO: 31 PG (ref 26–34)
MCHC RBC AUTO-ENTMCNC: 33.2 G/DL (ref 32–36)
MCV RBC AUTO: 93 FL (ref 80–100)
MONOCYTES # BLD AUTO: 0.5 X10*3/UL (ref 0.1–1)
MONOCYTES NFR BLD AUTO: 6.5 %
NEUTROPHILS # BLD AUTO: 4.13 X10*3/UL (ref 1.2–7.7)
NEUTROPHILS NFR BLD AUTO: 53.7 %
NON HDL CHOLESTEROL: 89 MG/DL (ref 0–149)
NRBC BLD-RTO: 0 /100 WBCS (ref 0–0)
PLATELET # BLD AUTO: 214 X10*3/UL (ref 150–450)
POTASSIUM SERPL-SCNC: 4 MMOL/L (ref 3.5–5.3)
PROT SERPL-MCNC: 6.8 G/DL (ref 6.4–8.2)
RBC # BLD AUTO: 4.1 X10*6/UL (ref 4–5.2)
SODIUM SERPL-SCNC: 143 MMOL/L (ref 136–145)
TRIGL SERPL-MCNC: 62 MG/DL (ref 0–149)
TSH SERPL-ACNC: 1.66 MIU/L (ref 0.44–3.98)
VLDL: 12 MG/DL (ref 0–40)
WBC # BLD AUTO: 7.7 X10*3/UL (ref 4.4–11.3)

## 2024-10-21 PROCEDURE — 80053 COMPREHEN METABOLIC PANEL: CPT

## 2024-10-21 PROCEDURE — 36415 COLL VENOUS BLD VENIPUNCTURE: CPT

## 2024-10-21 PROCEDURE — 84443 ASSAY THYROID STIM HORMONE: CPT

## 2024-10-21 PROCEDURE — 80061 LIPID PANEL: CPT

## 2024-10-21 PROCEDURE — 85025 COMPLETE CBC W/AUTO DIFF WBC: CPT

## 2024-10-24 ENCOUNTER — OFFICE VISIT (OUTPATIENT)
Dept: CARDIOLOGY | Facility: HOSPITAL | Age: 64
End: 2024-10-24
Payer: COMMERCIAL

## 2024-10-24 VITALS
BODY MASS INDEX: 22.52 KG/M2 | HEART RATE: 64 BPM | SYSTOLIC BLOOD PRESSURE: 134 MMHG | WEIGHT: 122.4 LBS | HEIGHT: 62 IN | DIASTOLIC BLOOD PRESSURE: 82 MMHG

## 2024-10-24 DIAGNOSIS — M25.512 LEFT SHOULDER PAIN, UNSPECIFIED CHRONICITY: ICD-10-CM

## 2024-10-24 DIAGNOSIS — Z82.49 FAMILY HISTORY OF CORONARY ARTERIOSCLEROSIS: Primary | ICD-10-CM

## 2024-10-24 DIAGNOSIS — R06.02 SHORTNESS OF BREATH: ICD-10-CM

## 2024-10-24 PROCEDURE — 99204 OFFICE O/P NEW MOD 45 MIN: CPT | Performed by: INTERNAL MEDICINE

## 2024-10-24 PROCEDURE — 93005 ELECTROCARDIOGRAM TRACING: CPT | Performed by: INTERNAL MEDICINE

## 2024-10-24 PROCEDURE — 99214 OFFICE O/P EST MOD 30 MIN: CPT | Performed by: INTERNAL MEDICINE

## 2024-10-24 PROCEDURE — 3008F BODY MASS INDEX DOCD: CPT | Performed by: INTERNAL MEDICINE

## 2024-10-24 PROCEDURE — 1036F TOBACCO NON-USER: CPT | Performed by: INTERNAL MEDICINE

## 2024-10-24 PROCEDURE — 93010 ELECTROCARDIOGRAM REPORT: CPT | Performed by: INTERNAL MEDICINE

## 2024-10-24 NOTE — PATIENT INSTRUCTIONS
Thanks for following up in office today.    1)  We reviewed your calcium score.  I do not think that a stress test would be any benefit to us.   I am recommending that we do a heart ultrasound on you called an echocardiogram.  This is a test that lets us know what your heart strength is and any issues with your valves.    2)  I am however recommending some blood tests.  Lipoprotein a,   high sensitivity CRP.       3) If you do develop any symptoms like of course chest pain or any shortness of breath with exertion you need to let me know and we would order a CTA of your coronary arteries.     4)  Please continue your cardiac medications as prescribed.    Follow up with me after testing .  If need to make a virtual appointment that is OK   If you have any questions, please call [phone] and choose option for Dr. Rey's nurse Purnima Conley     Hemostasis: [place] Bill 41814 For Specimen Handling/Conveyance To Laboratory?: no Post-Care Instructions: I reviewed with the patient in detail post-care instructions. Patient is to keep the biopsy site dry overnight, and then apply bacitracin twice daily until healed. Patient may apply hydrogen peroxide soaks to remove any crusting. Detail Level: Detailed Consent: Written consent was obtained and risks were reviewed including but not limited to scarring, infection, bleeding, scabbing, incomplete removal, nerve damage and allergy to anesthesia. Size Of Lesion In Cm (Optional): 0.3 Biopsy Type: H and E Destruction Type: electrodesiccation Wound Care: Petrolatum Billing Type: Third-Party Bill Bill As?: Biopsy by Shave Method Size Of Lesion After Curettage: 0.4 Notification Instructions: Patient will be notified of biopsy results. However, patient instructed to call the office if not contacted within 2 weeks. Anesthesia Type: 1% lidocaine with epinephrine and a 1:10 solution of 8.4% sodium bicarbonate Number Of Curettages: 3 Anesthesia Volume In Cc: 0.5

## 2024-10-24 NOTE — PROGRESS NOTES
Referred by Dr. Crowley ref. provider found for No chief complaint on file.     History Of Present Illness:    Eli Matthew is a 64 y.o. female presenting to [facility] care because of family history.   Has a history of left shoulder pain d/t rotator cuff tear, HLD, hypersensitivity pneumonitis, post menopause, left knee pain , weight loss.    She did have a mildly elevated Tchol - atorvastatin 10mg started ~ 1.5 years ago - no muscle aches/cramps with this.    Has some Sob d/t hypersensitivity pneumonitis - wears masks.    She runs, walks, swims.  In [place] when hiking in the middle of summer, while going uphill (5000k feet, very hot outside) she was SOB.        ROS:  The remainder of the review of systems was obtained, as was negative as pertains to the chief complaint.    Fhx:  younger sister aged 58 with MI, older sister had a PCI is 64  SocHx:  lifetime nonsmoker, social EtOH, no drug use    CV testing and labs:   10/2024 chol 150  HDL  61.1 LDL 77 trig 62  K 4.3  BUN 13  crea 0.83  ast 62  alt 27  H&H 12.7 38.2   CT CAC score 9/2024 total = 0     Past Medical History:  She has a past medical history of Body mass index (BMI) 27.0-27.9, adult (08/05/2022), Personal history of other infectious and parasitic diseases (09/08/2014), and Zoster keratitis (06/02/2014).    Past Surgical History:  She has a past surgical history that includes Other surgical history (03/31/2014) and Refractive surgery (12/01/2014).      Social History:  She reports that she has never smoked. She has never used smokeless tobacco. She reports current alcohol use. She reports that she does not use drugs.    Family History:  Family History   Problem Relation Name Age of Onset    Hypertension Mother      Cataracts Mother      Cataracts Father      Hypertension Father      Breast cancer Maternal Grandmother  85    Diabetes Other Grandfather     Breast cancer Maternal Cousin          Allergies:  Sulfa (sulfonamide antibiotics)    Outpatient  "Medications:  Current Outpatient Medications   Medication Instructions    atorvastatin (LIPITOR) 10 mg, oral, Daily    calcium 500 mg calcium (1,250 mg) tablet Take by mouth.    estradiol (VAGIFEM) 10 mcg, vaginal, 2 times weekly    fish oil concentrate (Omega-3) 120-180 mg capsule Take by mouth.    glucosamine/chondroitin/C/Moise (glucosamine-chondroit-vit C-Mn) 822-725-30-5 mg tablet Take by mouth.    meloxicam (MOBIC) 15 mg, oral, Daily PRN    multivitamin with minerals (MULTIPLE VITAMIN-MINERALS ORAL) Take by mouth.        Last Recorded Vitals:  Vitals:    10/24/24 1505   Weight: 55.5 kg (122 lb 6.4 oz)   Height: 1.575 m (5' 2.01\")       Physical Exam:  Physical Exam  HENT:      Head: Normocephalic.      Nose: Nose normal.      Mouth/Throat:      Mouth: Mucous membranes are moist.   Cardiovascular:      Rate and Rhythm: Normal rate and regular rhythm.      Comments: No significant murmurs  No carotid bruits   No leg swelling   No calf tenderness  Pulmonary:      Effort: Pulmonary effort is normal.      Comments: Faint wheeze heard  Abdominal:      Palpations: Abdomen is soft.   Musculoskeletal:         General: Normal range of motion.      Cervical back: Normal range of motion.   Skin:     General: Skin is warm and dry.   Neurological:      General: No focal deficit present.      Mental Status: She is alert.   Psychiatric:         Mood and Affect: Mood normal.              Last Labs:  CBC -  Lab Results   Component Value Date    WBC 7.7 10/21/2024    HGB 12.7 10/21/2024    HCT 38.2 10/21/2024    MCV 93 10/21/2024     10/21/2024       CMP -  Lab Results   Component Value Date    CALCIUM 9.6 10/21/2024    PROT 6.8 10/21/2024    ALBUMIN 4.2 10/21/2024    AST 62 (H) 10/21/2024    ALT 27 10/21/2024    ALKPHOS 47 10/21/2024    BILITOT 0.6 10/21/2024       LIPID PANEL -   Lab Results   Component Value Date    CHOL 150 10/21/2024    TRIG 62 10/21/2024    HDL 61.1 10/21/2024    CHHDL 2.5 10/21/2024    LDLF 143 (H) " 03/15/2023    VLDL 12 10/21/2024    NHDL 89 10/21/2024       RENAL FUNCTION PANEL -   Lab Results   Component Value Date    GLUCOSE 98 10/21/2024     10/21/2024    K 4.0 10/21/2024     10/21/2024    CO2 31 10/21/2024    ANIONGAP 10 10/21/2024    BUN 13 10/21/2024    CREATININE 0.83 10/21/2024    CALCIUM 9.6 10/21/2024    ALBUMIN 4.2 10/21/2024        Lab Results   Component Value Date    BNP 19 04/24/2022           Assessment/Plan   Fhx premature CAD as above  Dyspnea on heavy exertion  L shoulder pain - awaiting arthroscopic surgery for rotator cuff tear    Plan:  -check Lp(a)  -check hsCRP  -check TTE

## 2024-11-07 ENCOUNTER — HOSPITAL ENCOUNTER (OUTPATIENT)
Dept: CARDIOLOGY | Facility: CLINIC | Age: 64
Discharge: HOME | End: 2024-11-07
Payer: COMMERCIAL

## 2024-11-07 ENCOUNTER — LAB (OUTPATIENT)
Dept: LAB | Facility: LAB | Age: 64
End: 2024-11-07
Payer: COMMERCIAL

## 2024-11-07 VITALS
BODY MASS INDEX: 22.45 KG/M2 | SYSTOLIC BLOOD PRESSURE: 124 MMHG | WEIGHT: 122 LBS | DIASTOLIC BLOOD PRESSURE: 70 MMHG | HEIGHT: 62 IN

## 2024-11-07 DIAGNOSIS — Z82.49 FAMILY HISTORY OF CORONARY ARTERIOSCLEROSIS: ICD-10-CM

## 2024-11-07 DIAGNOSIS — M75.22 BICIPITAL TENDINITIS, LEFT SHOULDER: ICD-10-CM

## 2024-11-07 DIAGNOSIS — M75.102 UNSPECIFIED ROTATOR CUFF TEAR OR RUPTURE OF LEFT SHOULDER, NOT SPECIFIED AS TRAUMATIC: ICD-10-CM

## 2024-11-07 DIAGNOSIS — R06.02 SHORTNESS OF BREATH: ICD-10-CM

## 2024-11-07 LAB
ALBUMIN SERPL BCP-MCNC: 4.4 G/DL (ref 3.4–5)
ALP SERPL-CCNC: 47 U/L (ref 33–136)
ALT SERPL W P-5'-P-CCNC: 27 U/L (ref 7–45)
ANION GAP SERPL CALC-SCNC: 11 MMOL/L (ref 10–20)
APTT PPP: 29 SECONDS (ref 27–38)
AST SERPL W P-5'-P-CCNC: 62 U/L (ref 9–39)
BASOPHILS # BLD AUTO: 0.01 X10*3/UL (ref 0–0.1)
BASOPHILS NFR BLD AUTO: 0.2 %
BILIRUB SERPL-MCNC: 0.8 MG/DL (ref 0–1.2)
BUN SERPL-MCNC: 16 MG/DL (ref 6–23)
CALCIUM SERPL-MCNC: 9.5 MG/DL (ref 8.6–10.3)
CHLORIDE SERPL-SCNC: 104 MMOL/L (ref 98–107)
CO2 SERPL-SCNC: 29 MMOL/L (ref 21–32)
CREAT SERPL-MCNC: 0.85 MG/DL (ref 0.5–1.05)
CRP SERPL HS-MCNC: 2.4 MG/L
EGFRCR SERPLBLD CKD-EPI 2021: 77 ML/MIN/1.73M*2
EOSINOPHIL # BLD AUTO: 0.13 X10*3/UL (ref 0–0.7)
EOSINOPHIL NFR BLD AUTO: 2.2 %
ERYTHROCYTE [DISTWIDTH] IN BLOOD BY AUTOMATED COUNT: 12.5 % (ref 11.5–14.5)
GLUCOSE SERPL-MCNC: 84 MG/DL (ref 74–99)
HCT VFR BLD AUTO: 39.2 % (ref 36–46)
HGB BLD-MCNC: 12.8 G/DL (ref 12–16)
IMM GRANULOCYTES # BLD AUTO: 0.01 X10*3/UL (ref 0–0.7)
IMM GRANULOCYTES NFR BLD AUTO: 0.2 % (ref 0–0.9)
INR PPP: 1 (ref 0.9–1.1)
LYMPHOCYTES # BLD AUTO: 2.1 X10*3/UL (ref 1.2–4.8)
LYMPHOCYTES NFR BLD AUTO: 34.9 %
MCH RBC QN AUTO: 31.1 PG (ref 26–34)
MCHC RBC AUTO-ENTMCNC: 32.7 G/DL (ref 32–36)
MCV RBC AUTO: 95 FL (ref 80–100)
MONOCYTES # BLD AUTO: 0.38 X10*3/UL (ref 0.1–1)
MONOCYTES NFR BLD AUTO: 6.3 %
NEUTROPHILS # BLD AUTO: 3.39 X10*3/UL (ref 1.2–7.7)
NEUTROPHILS NFR BLD AUTO: 56.2 %
NRBC BLD-RTO: 0 /100 WBCS (ref 0–0)
PLATELET # BLD AUTO: 239 X10*3/UL (ref 150–450)
POTASSIUM SERPL-SCNC: 4.1 MMOL/L (ref 3.5–5.3)
PROT SERPL-MCNC: 7 G/DL (ref 6.4–8.2)
PROTHROMBIN TIME: 11.4 SECONDS (ref 9.8–12.8)
RBC # BLD AUTO: 4.11 X10*6/UL (ref 4–5.2)
SODIUM SERPL-SCNC: 140 MMOL/L (ref 136–145)
WBC # BLD AUTO: 6 X10*3/UL (ref 4.4–11.3)

## 2024-11-07 PROCEDURE — 85025 COMPLETE CBC W/AUTO DIFF WBC: CPT

## 2024-11-07 PROCEDURE — 80053 COMPREHEN METABOLIC PANEL: CPT

## 2024-11-07 PROCEDURE — 93306 TTE W/DOPPLER COMPLETE: CPT | Performed by: INTERNAL MEDICINE

## 2024-11-07 PROCEDURE — 85610 PROTHROMBIN TIME: CPT

## 2024-11-07 PROCEDURE — 93306 TTE W/DOPPLER COMPLETE: CPT

## 2024-11-07 PROCEDURE — 85730 THROMBOPLASTIN TIME PARTIAL: CPT

## 2024-11-07 PROCEDURE — 82172 ASSAY OF APOLIPOPROTEIN: CPT

## 2024-11-07 PROCEDURE — 86141 C-REACTIVE PROTEIN HS: CPT

## 2024-11-07 PROCEDURE — 36415 COLL VENOUS BLD VENIPUNCTURE: CPT

## 2024-11-08 ENCOUNTER — ANCILLARY PROCEDURE (OUTPATIENT)
Dept: CARDIOLOGY | Facility: CLINIC | Age: 64
End: 2024-11-08
Payer: COMMERCIAL

## 2024-11-08 DIAGNOSIS — M75.102 UNSPECIFIED ROTATOR CUFF TEAR OR RUPTURE OF LEFT SHOULDER, NOT SPECIFIED AS TRAUMATIC: ICD-10-CM

## 2024-11-08 DIAGNOSIS — M75.22 BICIPITAL TENDINITIS, LEFT SHOULDER: ICD-10-CM

## 2024-11-08 NOTE — PROGRESS NOTES
Patient in office for EKG for rotator cuff surgery, ordered by Han Ortez PA-C and supervised by Doctor Meron

## 2024-11-10 LAB
AORTIC VALVE MEAN GRADIENT: 3 MMHG
AORTIC VALVE PEAK VELOCITY: 1.14 M/S
AV PEAK GRADIENT: 5 MMHG
AVA (PEAK VEL): 2.45 CM2
AVA (VTI): 2.91 CM2
EJECTION FRACTION APICAL 4 CHAMBER: 45.3
EJECTION FRACTION: 58 %
LEFT ATRIUM VOLUME AREA LENGTH INDEX BSA: 23.1 ML/M2
LEFT VENTRICLE INTERNAL DIMENSION DIASTOLE: 4.31 CM (ref 3.5–6)
LEFT VENTRICULAR OUTFLOW TRACT DIAMETER: 1.97 CM
LPA SERPL-MCNC: 102 MG/DL
LV EJECTION FRACTION BIPLANE: 52 %
MITRAL VALVE E/A RATIO: 1.54
RIGHT VENTRICLE PEAK SYSTOLIC PRESSURE: 23.4 MMHG

## 2024-11-11 ENCOUNTER — TELEPHONE (OUTPATIENT)
Dept: CARDIOLOGY | Facility: HOSPITAL | Age: 64
End: 2024-11-11
Payer: COMMERCIAL

## 2024-11-11 NOTE — TELEPHONE ENCOUNTER
Pt called in concerned of her TTE results. She is planning on going out of town and wants to make sure she is okay to be going out of town and would like to talk about her results. Please give to a call.    Maxine TESFAYE

## 2024-11-12 DIAGNOSIS — R06.02 SHORTNESS OF BREATH AT REST: Primary | ICD-10-CM

## 2024-11-12 DIAGNOSIS — E78.2 MIXED HYPERLIPIDEMIA: ICD-10-CM

## 2024-11-12 RX ORDER — ATORVASTATIN CALCIUM 20 MG/1
20 TABLET, FILM COATED ORAL DAILY
Qty: 30 TABLET | Refills: 0 | Status: SHIPPED | OUTPATIENT
Start: 2024-11-12 | End: 2024-12-12

## 2024-11-12 RX ORDER — METOPROLOL SUCCINATE 25 MG/1
12.5 TABLET, EXTENDED RELEASE ORAL DAILY
Qty: 15 TABLET | Refills: 3 | Status: SHIPPED | OUTPATIENT
Start: 2024-11-12 | End: 2025-11-12

## 2024-11-12 NOTE — PROGRESS NOTES
Discussed with Eli results of echocardiogram, which demonstrated normal LV fcn, mild LVH, and mid and basal inferior hypokinesis.    She continues to deny chest pain, and has had no new episodes of shortness of breath.  We reviewed the result of her Lp(a) and HS-CRP - both of which were elevated.    I recommended further evaluation with a stress test.  I am ordering a treadmill nuclear stress test - I will ask Michelle Avelar to contact the patient to help her schedule this.  I also recommended she start aspirin 81mg daily, metoprolol succinate ER 25mg half tablet daily, and that she increase her statin to atorvastatin 20mg daily.

## 2024-11-14 ENCOUNTER — TELEPHONE (OUTPATIENT)
Dept: CARDIOLOGY | Facility: HOSPITAL | Age: 64
End: 2024-11-14
Payer: COMMERCIAL

## 2024-11-14 DIAGNOSIS — Z82.49 FAMILY HISTORY OF CORONARY ARTERIOSCLEROSIS: ICD-10-CM

## 2024-11-14 DIAGNOSIS — R06.02 SHORTNESS OF BREATH: Primary | ICD-10-CM

## 2024-11-14 NOTE — TELEPHONE ENCOUNTER
"Patient called in very concerned as per patient \"had a discussion with Dr. Rey, and, she really wanted me to have a stress test, does not want me to go on vacation without  having a stress test. I have been going on and off with central scheduling, long story short, they have an opening tomorrow and I need Dr. Rey to place a stat order\" I advised Patient that there is no such thing as a stat order.  Patient then received a call from another line and took that call, will call back.   "

## 2024-11-15 ENCOUNTER — TELEPHONE (OUTPATIENT)
Dept: ORTHOPEDIC SURGERY | Facility: CLINIC | Age: 64
End: 2024-11-15
Payer: COMMERCIAL

## 2024-11-15 NOTE — TELEPHONE ENCOUNTER
Called PT to schedule A Shoulder Sling Fitting. PT has to go through a series of testing and get confirmation through cardiologist before receiving Surgery from Dr. Robles

## 2024-11-18 ENCOUNTER — HOSPITAL ENCOUNTER (OUTPATIENT)
Dept: RADIOLOGY | Facility: HOSPITAL | Age: 64
Discharge: HOME | End: 2024-11-18
Payer: COMMERCIAL

## 2024-11-18 ENCOUNTER — HOSPITAL ENCOUNTER (OUTPATIENT)
Dept: CARDIOLOGY | Facility: HOSPITAL | Age: 64
Discharge: HOME | End: 2024-11-18
Payer: COMMERCIAL

## 2024-11-18 DIAGNOSIS — R06.02 SHORTNESS OF BREATH: ICD-10-CM

## 2024-11-18 PROCEDURE — 93016 CV STRESS TEST SUPVJ ONLY: CPT | Performed by: INTERNAL MEDICINE

## 2024-11-18 PROCEDURE — 78452 HT MUSCLE IMAGE SPECT MULT: CPT

## 2024-11-18 PROCEDURE — 93018 CV STRESS TEST I&R ONLY: CPT | Performed by: INTERNAL MEDICINE

## 2024-11-18 PROCEDURE — 3430000001 HC RX 343 DIAGNOSTIC RADIOPHARMACEUTICALS: Performed by: INTERNAL MEDICINE

## 2024-11-18 PROCEDURE — A9502 TC99M TETROFOSMIN: HCPCS | Performed by: INTERNAL MEDICINE

## 2024-11-18 PROCEDURE — 78452 HT MUSCLE IMAGE SPECT MULT: CPT | Performed by: INTERNAL MEDICINE

## 2024-11-18 PROCEDURE — 93017 CV STRESS TEST TRACING ONLY: CPT

## 2024-11-20 ENCOUNTER — TELEPHONE (OUTPATIENT)
Dept: CARDIOLOGY | Facility: HOSPITAL | Age: 64
End: 2024-11-20
Payer: COMMERCIAL

## 2024-11-20 NOTE — TELEPHONE ENCOUNTER
RN called pt with results that this time. Stress test is normal at this time. Pt verbalized understanding.

## 2024-11-21 ENCOUNTER — TELEPHONE (OUTPATIENT)
Dept: CARDIOLOGY | Facility: HOSPITAL | Age: 64
End: 2024-11-21
Payer: COMMERCIAL

## 2024-11-21 NOTE — TELEPHONE ENCOUNTER
She left a VM stating that she would like to talk to the nurse regarding an upcoming surgery, She has some questions

## 2024-11-25 ENCOUNTER — APPOINTMENT (OUTPATIENT)
Dept: ORTHOPEDIC SURGERY | Facility: CLINIC | Age: 64
End: 2024-11-25
Payer: COMMERCIAL

## 2024-11-27 NOTE — TELEPHONE ENCOUNTER
Pt called in asking if she can hold her aspirin, she doesn't require cardiac clearance from the orthopedic office but just wants to make sure she can hold her aspirin before her surgery. Please give the pt a call back.    Thank you!  Maxine TESFAYE

## 2024-11-29 ENCOUNTER — APPOINTMENT (OUTPATIENT)
Dept: RADIOLOGY | Facility: HOSPITAL | Age: 64
End: 2024-11-29
Payer: COMMERCIAL

## 2024-11-29 ENCOUNTER — APPOINTMENT (OUTPATIENT)
Dept: CARDIOLOGY | Facility: HOSPITAL | Age: 64
End: 2024-11-29
Payer: COMMERCIAL

## 2024-11-29 NOTE — TELEPHONE ENCOUNTER
I called patient initially and she is telling me that surgeon is OK with her taking it, but she wants to know what Dr wants her to do. I reviewed with Dr Rey.  Called her back and told her that it is ok for her to hold aspirin prior to her surgery that is scheduled for 12/6/24.  Resume when surgeon states that it is OK to.

## 2024-12-02 ENCOUNTER — APPOINTMENT (OUTPATIENT)
Dept: PRIMARY CARE | Facility: CLINIC | Age: 64
End: 2024-12-02
Payer: COMMERCIAL

## 2024-12-02 VITALS
BODY MASS INDEX: 23 KG/M2 | RESPIRATION RATE: 16 BRPM | TEMPERATURE: 97.3 F | WEIGHT: 125 LBS | HEIGHT: 62 IN | HEART RATE: 67 BPM | DIASTOLIC BLOOD PRESSURE: 70 MMHG | SYSTOLIC BLOOD PRESSURE: 122 MMHG | OXYGEN SATURATION: 99 %

## 2024-12-02 DIAGNOSIS — Z00.00 HEALTHCARE MAINTENANCE: Primary | ICD-10-CM

## 2024-12-02 PROCEDURE — 3008F BODY MASS INDEX DOCD: CPT | Performed by: INTERNAL MEDICINE

## 2024-12-02 PROCEDURE — 99396 PREV VISIT EST AGE 40-64: CPT | Performed by: INTERNAL MEDICINE

## 2024-12-02 ASSESSMENT — ENCOUNTER SYMPTOMS
SLEEP DISTURBANCE: 1
ABDOMINAL PAIN: 0
MYALGIAS: 0
CONSTIPATION: 0
DIARRHEA: 0

## 2024-12-02 NOTE — PROGRESS NOTES
Patient here for a physical     Subjective   Patient ID: Eli Matthew is a 64 y.o. female who presents for Annual Exam.    The patient mentions a family history of premature coronary artery disease in her Sister and other family members.  She established with  from Cardiology, and completed an Nuclear Stress Test which showed no signs of ischemia.  The patient also underwent an echocardiogram in 11/2024 which showed an EF of 55-60%.  She has been taking the increased dosage of atorvastatin at 20mg once daily as clinically advised, and is tolerating the medication well.  The patient denies any myalgia.    The patient is scheduled to undergo left rotator cuff repair on 12/6/2024 with  from Orthopedic Surgery.  She inquires whether she requires formal cardiac clearance to proceed with the procedure.  The patient states that symptoms are currently limiting her ability to sleep.  She is wearing the shoulder sling regularly, and finds that this is helping.    The patient denies any abdominal pain or bowel problems.     The patient maintains an active lifestyle and runs three to four times weekly, and occasionally completes marathons.       Review of Systems   Gastrointestinal:  Negative for abdominal pain, constipation and diarrhea.   Musculoskeletal:  Negative for myalgias.        Positive for left shoulder pain.   Psychiatric/Behavioral:  Positive for sleep disturbance.      Objective   Physical Exam  Constitutional:       Appearance: Normal appearance.   Neck:      Vascular: No carotid bruit.   Cardiovascular:      Rate and Rhythm: Normal rate and regular rhythm.      Heart sounds: Normal heart sounds.   Pulmonary:      Effort: Pulmonary effort is normal.      Breath sounds: Normal breath sounds.   Abdominal:      General: Bowel sounds are normal.      Palpations: Abdomen is soft.      Tenderness: There is no abdominal tenderness.   Skin:     General: Skin is warm and dry.   Neurological:       General: No focal deficit present.      Mental Status: She is alert and oriented to person, place, and time. Mental status is at baseline.   Psychiatric:         Mood and Affect: Mood normal.         Behavior: Behavior normal.         Assessment/Plan   Problem List Items Addressed This Visit    None  Visit Diagnoses         Codes    Healthcare maintenance    -  Primary Z00.00            CPE Performed  -  Discussed healthy diet and regular exercise.    -  Physical exam overall unremarkable. Immunizations reviewed and updated accordingly. Healthy lifestyle choices discussed (tobacco avoidance, appropriate alcohol use, avoidance of illicit substances).   -  Patient is wearing seatbelt.   -  Screening lab work ordered as indicated.    -  Age appropriate screening tests reviewed with patient.        IMPRESSION/PLAN:     Left Shoulder Pain  - Previously prescribed meloxicam 15mg once daily prn, and advised to take with food to avoid adverse effects. Xray left shoulder 1/2024 unremarkable.  Previously ordered referral to Physical Therapy.  Following  from Orthopedic Surgery.  Scheduled for rotator cuff repair on 12/6/2024 per patient with .    BP at 122/70 today in office.     HLD   - Cholesterol and LDL borderline high per 3/2023.  Continue with atorvastatin 20 mg once daily.  ASCVD Risk 5.4% per 8/2023.  Previously ordered CMP and lipid panel to be completed in the fasting state.     Family History CAD  - Nuclear Stress Test 11/2024 showed no signs of ischemia.  Following  from Cardiology.    Hypersensitivity pneumonitis  - Last CT Chest 12/2022 showed interval resolution with marked improvement compared to previous imaging from 4/2022.  She is following with pulm (Dr. Ocampo) as well. On exam- lungs were clear.      Post-menopause   - Takes Estradiol 10 mcg vaginal tablet as directed. Follows with Dr. Hammond in Gynecology.       Chronic Left Knee Pain  - Prescribed meloxicam 15mg once daily  prn, and advised to take with food to avoid adverse effects.   Xray left knee 1/2024 unremarkable.  Previously ordered referral to Physical Therapy.  Call the clinic if symptoms persist or worsen, and will consider referral to  from Orthopedic Surgery.     Weight Loss  -  Patient lost weight on her own with diet and active lifestyle.  Advised to call the clinic if she continues to lose weight.  Previously prescribed semaglutide 0.25mg/0.5ml as 0.25 mg once weekly.  But unable to take due to issues with coverage.     Health Maintenance   - Routine labs 10/2024. Last Pap 1/2024.  Last Mammogram 8/2024.  Last colonoscopy 11/2022, repeat due 11/2029. Patient prefers to repeat colonoscopy in 2027 instead.     Follow up in 6 months, call sooner if needed.       Scribe Attestation  By signing my name below, IAmy Scribe   attest that this documentation has been prepared under the direction and in the presence of Juan Harris DO.   Amy Perkins 12/02/24 3:57 PM

## 2024-12-05 DIAGNOSIS — G89.18 POST-OP PAIN: Primary | ICD-10-CM

## 2024-12-05 RX ORDER — ASPIRIN 81 MG/1
81 TABLET ORAL 2 TIMES DAILY
Qty: 28 TABLET | Refills: 0 | Status: SHIPPED | OUTPATIENT
Start: 2024-12-05 | End: 2024-12-19

## 2024-12-05 RX ORDER — OXYCODONE AND ACETAMINOPHEN 5; 325 MG/1; MG/1
1 TABLET ORAL EVERY 6 HOURS PRN
Qty: 28 TABLET | Refills: 0 | Status: SHIPPED | OUTPATIENT
Start: 2024-12-05 | End: 2024-12-12

## 2024-12-06 ENCOUNTER — TELEPHONE (OUTPATIENT)
Dept: ORTHOPEDIC SURGERY | Facility: CLINIC | Age: 64
End: 2024-12-06

## 2024-12-06 ENCOUNTER — APPOINTMENT (OUTPATIENT)
Dept: PRIMARY CARE | Facility: CLINIC | Age: 64
End: 2024-12-06
Payer: COMMERCIAL

## 2024-12-06 DIAGNOSIS — G89.18 POST-OP PAIN: Primary | ICD-10-CM

## 2024-12-06 RX ORDER — ONDANSETRON 4 MG/1
4 TABLET, FILM COATED ORAL EVERY 8 HOURS PRN
Qty: 20 TABLET | Refills: 0 | Status: SHIPPED | OUTPATIENT
Start: 2024-12-06 | End: 2024-12-13

## 2024-12-17 ENCOUNTER — TELEMEDICINE (OUTPATIENT)
Dept: CARDIOLOGY | Facility: HOSPITAL | Age: 64
End: 2024-12-17
Payer: COMMERCIAL

## 2024-12-17 DIAGNOSIS — E78.2 MIXED HYPERLIPIDEMIA: ICD-10-CM

## 2024-12-17 DIAGNOSIS — Z82.49 FAMILY HISTORY OF ISCHEMIC HEART DISEASE: Primary | ICD-10-CM

## 2024-12-17 DIAGNOSIS — R06.02 SHORTNESS OF BREATH AT REST: ICD-10-CM

## 2024-12-17 PROCEDURE — 1036F TOBACCO NON-USER: CPT | Performed by: INTERNAL MEDICINE

## 2024-12-17 PROCEDURE — 99214 OFFICE O/P EST MOD 30 MIN: CPT | Performed by: INTERNAL MEDICINE

## 2024-12-17 RX ORDER — ATORVASTATIN CALCIUM 20 MG/1
20 TABLET, FILM COATED ORAL DAILY
Qty: 90 TABLET | Refills: 3 | Status: SHIPPED | OUTPATIENT
Start: 2024-12-17 | End: 2025-12-17

## 2024-12-17 RX ORDER — METOPROLOL SUCCINATE 25 MG/1
12.5 TABLET, EXTENDED RELEASE ORAL DAILY
Qty: 45 TABLET | Refills: 3 | Status: SHIPPED | OUTPATIENT
Start: 2024-12-17 | End: 2025-12-17

## 2024-12-17 NOTE — PATIENT INSTRUCTIONS
Thanks for following up in office today.    1)  If you have any new symptoms including unusual shortness of breath or chest pain, please call so that we can consider further testing.    2)  Please continue your cardiac medications as prescribed. I refilled your medications as requested.    3)  Go to the [facility] for additional recommendations for primary prevention of coronary artery disease, and lifestyle modifications to limit your risk of heart attack or stroke    Follow up with Dr Rey in 6 months  If you have any questions, please call [phone]

## 2024-12-17 NOTE — PROGRESS NOTES
Chief Complaint:   No chief complaint on file.     History Of Present Illness:    Eli Matthew is a 64 y.o. female presenting follow-up after testing.  H/o HLD, hypersensitivity pneumonitis, post menopause, left knee pain, weight loss, L shoulder pain d/t rotator cuff tear.    The patient is doing well from cardiovascular perspective.  Denies chest pain/pressure, SOB, dyspnea on exertion, LE edema, orthopnea, PND, palpitations, LH/dizziness, presyncope, overt syncope.  Compliant with home cardiac medications as prescribed. Denies new rashes.    S/p shoulder surgery 1.5 weeks ago. Ortho felt comfortable going ahead with surgery after stress test. She has been mostly inactive during recovery but tries to walk.     She has questions about BW findings. She wonders if there is anything she can do about elevated Lip(a) and CRP. Trying to avoid dairy.    Review Of Systems:  The remainder of the review of systems was obtained, and was negative as pertains to the chief complaint.    Fhx:  younger sister aged 58 with MI, older sister had a PCI is 64  SocHx:  lifetime nonsmoker, social EtOH, no drug use  Activity: runs, walks, swims     CV testing and labs reviewed:    Labs 11/2024: K 4.1  BUN 16  Cr 0.85  ALT 27  AST 62  H&H 12.8 39.2  Lip(a) 102  Lipid panel 10/2024:   HDL 61.1  LDL 77  TG 62    Nuclear stress test 11/2024:  IMPRESSION:  1. Normal stress myocardial perfusion imaging.  2. Well-maintained left ventricular function.  78%    TTE 11/2024:   CONCLUSIONS:   1. Basal and mid inferior wall is abnormal.   2. Left ventricular ejection fraction is normal, by visual estimate at 55-60%.   3. Abnormal wall motion.   4. Intact intraventricular septum without shunting or a ventricular septal defect.   5. No left ventricular thrombus visualized.   6. There is normal right ventricular global systolic function.   7. No evidence of mitral valve prolapse.   8. Aortic valve stenosis is not present.    EKG 10/2024:  NSR  HR 64    CT CAC score 9/2024 total = 0     Last Recorded Vitals:  There were no vitals filed for this visit.    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    Past Surgical History:  She has a past surgical history that includes Other surgical history (03/31/2014) and Refractive surgery (12/01/2014).      Social History:  She reports that she has never smoked. She has never used smokeless tobacco. She reports current alcohol use. She reports that she does not use drugs.    Family History:  Family History   Problem Relation Name Age of Onset    Hypertension Mother      Cataracts Mother      Cataracts Father      Hypertension Father      Breast cancer Maternal Grandmother  85    Diabetes Other Grandfather     Breast cancer Maternal Cousin          Allergies:  Sulfa (sulfonamide antibiotics)    Outpatient Medications:  Current Outpatient Medications   Medication Instructions    aspirin 81 mg, oral, 2 times daily    atorvastatin (LIPITOR) 20 mg, oral, Daily    calcium 500 mg calcium (1,250 mg) tablet Take by mouth.    estradiol (VAGIFEM) 10 mcg, vaginal, 2 times weekly    fish oil concentrate (Omega-3) 120-180 mg capsule Take by mouth.    glucosamine/chondroitin/C/Moise (glucosamine-chondroit-vit C-Mn) 463-130-63-5 mg tablet Take by mouth.    metoprolol succinate XL (TOPROL-XL) 12.5 mg, oral, Daily, Do not crush or chew.    multivitamin with minerals (MULTIPLE VITAMIN-MINERALS ORAL) Take by mouth.       Physical Exam:  Physical Exam  HENT:      Head: Normocephalic.      Nose: Nose normal.      Mouth/Throat:      Mouth: Mucous membranes are moist.   Cardiovascular:      Comments: No JVD at 90 degrees  Pulmonary:      Effort: Pulmonary effort is normal.   Abdominal:      Palpations: Abdomen is soft.   Musculoskeletal:         General: Normal range of  motion.      Cervical back: Normal range of motion.      Right lower leg: No edema.      Left lower leg: No edema.   Skin:     General: Skin is warm and dry.   Neurological:      Mental Status: She is alert.   Psychiatric:         Mood and Affect: Mood normal.       Last Labs:  CBC -  Lab Results   Component Value Date    WBC 6.0 11/07/2024    HGB 12.8 11/07/2024    HCT 39.2 11/07/2024    MCV 95 11/07/2024     11/07/2024       CMP -  Lab Results   Component Value Date    CALCIUM 9.5 11/07/2024    PROT 7.0 11/07/2024    ALBUMIN 4.4 11/07/2024    AST 62 (H) 11/07/2024    ALT 27 11/07/2024    ALKPHOS 47 11/07/2024    BILITOT 0.8 11/07/2024       LIPID PANEL -   Lab Results   Component Value Date    CHOL 150 10/21/2024    TRIG 62 10/21/2024    HDL 61.1 10/21/2024    CHHDL 2.5 10/21/2024    LDLF 143 (H) 03/15/2023    VLDL 12 10/21/2024    NHDL 89 10/21/2024       RENAL FUNCTION PANEL -   Lab Results   Component Value Date    GLUCOSE 84 11/07/2024     11/07/2024    K 4.1 11/07/2024     11/07/2024    CO2 29 11/07/2024    ANIONGAP 11 11/07/2024    BUN 16 11/07/2024    CREATININE 0.85 11/07/2024    CALCIUM 9.5 11/07/2024    ALBUMIN 4.4 11/07/2024        Lab Results   Component Value Date    BNP 19 04/24/2022     Assessment/Plan   Fhx premature CAD:   Lip(a) 102 recently, and had elevated CRP.    She has a strong Fhx with sisters who had recent MI/PCI.  -c/w aspirin 81mg daily  -atorvastatin 20mg daily  -ongoing primary prevention including diet and exercise - she eats a reasonable diet and exercises regularly- can visit [facility] for additional information    Dyspnea on heavy exertion:  Recent stress test 11/2024 normal.  TTE with normal LV fcn.  -serial monitoring    HLD:  Recent FLP at goal.  -continue atorvastatin 20 mg daily    L shoulder pain: s/p arthroscopic surgery for rotator cuff tear 1.5 weeks ago. Recovering well.     Scribe Attestation  By signing my name below, I, Sarahi Figueroa    attest that this documentation has been prepared under the direction and in the presence of Jimmie Rey MD.

## 2024-12-20 ENCOUNTER — TELEPHONE (OUTPATIENT)
Dept: CARDIOLOGY | Facility: HOSPITAL | Age: 64
End: 2024-12-20
Payer: COMMERCIAL

## 2024-12-23 ENCOUNTER — EVALUATION (OUTPATIENT)
Dept: PHYSICAL THERAPY | Facility: CLINIC | Age: 64
End: 2024-12-23
Payer: COMMERCIAL

## 2024-12-23 ENCOUNTER — OFFICE VISIT (OUTPATIENT)
Dept: ORTHOPEDIC SURGERY | Facility: CLINIC | Age: 64
End: 2024-12-23
Payer: COMMERCIAL

## 2024-12-23 DIAGNOSIS — M25.512 ACUTE PAIN OF LEFT SHOULDER: Primary | ICD-10-CM

## 2024-12-23 DIAGNOSIS — G89.18 POST-OP PAIN: Primary | ICD-10-CM

## 2024-12-23 PROCEDURE — 99211 OFF/OP EST MAY X REQ PHY/QHP: CPT | Performed by: ORTHOPAEDIC SURGERY

## 2024-12-23 PROCEDURE — 97161 PT EVAL LOW COMPLEX 20 MIN: CPT | Mod: GP

## 2024-12-23 PROCEDURE — 97140 MANUAL THERAPY 1/> REGIONS: CPT | Mod: GP

## 2024-12-23 PROCEDURE — 97110 THERAPEUTIC EXERCISES: CPT | Mod: GP

## 2024-12-23 PROCEDURE — 1036F TOBACCO NON-USER: CPT | Performed by: ORTHOPAEDIC SURGERY

## 2024-12-23 RX ORDER — NAPROXEN 500 MG/1
500 TABLET ORAL 2 TIMES DAILY
Qty: 60 TABLET | Refills: 0 | Status: SHIPPED | OUTPATIENT
Start: 2024-12-23 | End: 2025-01-22

## 2024-12-23 RX ORDER — NAPROXEN 500 MG/1
500 TABLET ORAL 2 TIMES DAILY
Qty: 60 TABLET | Refills: 0 | Status: SHIPPED | OUTPATIENT
Start: 2024-12-23 | End: 2024-12-23

## 2024-12-23 RX ORDER — METHYLPREDNISOLONE 4 MG/1
4 TABLET ORAL ONCE
Qty: 21 TABLET | Refills: 0 | Status: SHIPPED | OUTPATIENT
Start: 2024-12-23 | End: 2024-12-23

## 2024-12-23 RX ORDER — CYCLOBENZAPRINE HCL 10 MG
10 TABLET ORAL 3 TIMES DAILY PRN
Qty: 30 TABLET | Refills: 0 | Status: SHIPPED | OUTPATIENT
Start: 2024-12-23 | End: 2024-12-23

## 2024-12-23 RX ORDER — CYCLOBENZAPRINE HCL 10 MG
10 TABLET ORAL 3 TIMES DAILY PRN
Qty: 30 TABLET | Refills: 0 | Status: SHIPPED | OUTPATIENT
Start: 2024-12-23 | End: 2025-01-02

## 2024-12-23 NOTE — PROGRESS NOTES
History of Present Illness:  Patient returns today after shoulder arthroscopy.  Denies any numbness tingling or shortness of breath.  Pain is appropriate for post-op course.    Physical Examination:  Mild swelling  Incisions healthy, no drainage or erythema  Tolerates gentle passive forward flexion  Neurovascular exam normal distally    Assessment:  Patient status post left shoulder arthroscopy with SLAP repair, biceps tenodesis, subacromial decompression, rotator cuff repair    Plan:  Surgical details discussed.  Encouraged non-opioid pain medications.  Discussed sling wear and activity restrictions.  Discussed physical therapy protocol.    A nonsteroidal anti-inflammatory drug (NSAID) was prescribed.  We reviewed the risks (including gastric issues, renal issues) and benefits of the medication.  We discussed a scheduled course if no adverse reactions to help with swelling / pain.  We discussed that chronic usage should be checked with primary care physician.       Follow-up ~ 1 month.    Han Ortez PA-C    In a face to face encounter, I evaluated the patient and performed a physical examination, discussed pertinent diagnostic studies if indicated and discussed diagnosis and management strategies with both the patient and physician assistant / nurse practitioner.  I reviewed the PA/NP's note and agree with the documented findings and plan of care.    Patient status post rotator cuff repair also with a superior labral tear who has some inflammation in her shoulder we discussed formal therapy which starts today as well as a Medrol Dosepak to help with the inflammation.    Viraj Robles MD

## 2024-12-23 NOTE — PROGRESS NOTES
Physical Therapy Evaluation    Patient Name: Eli Matthew  MRN: [id number]  Time Calculation  Start Time: 1315  Stop Time: 1359  Time Calculation (min): 44 min  PT Evaluation Time Entry  PT Evaluation (Low) Time Entry: 10  PT Therapeutic Procedures Time Entry  Manual Therapy Time Entry: 15  Therapeutic Exercise Time Entry: 20                   Current Problem  1. Acute pain of left shoulder          Insurance    Insurance reviewed   Visit number: 1  Approved number of visits: 29  ANTHEM 30V ( 29 REMAIN PT ) COPAY 0 DED 5000(4850),COVERAGE 80 OOP 0 NO AUTH REQ,RE       Subjective   General:  Pt reports to the clinic s/p left shoulder arthroscopy with SLAP repair, biceps tenodesis, subacromial decompression, rotator cuff repair on 12/6/24 with Dr. Robles. Pt elected for surgery after lifting heavy wagon last fall and she thought she pulled something. Patient denies any abnormal/calf pain, acute/abnormal lower leg swelling, shortness of breath or any overt signs of DVT/PE. Patient also denies any acute irritation of surgical site, no odorous/abnormally colored discharge or overt signs of infection. Denies any falls, /GI changes, fever/night sweats/pain, loss of appetite, or unintentional weight loss/gain. Pt is still donned in L shoulder sling, that she has been instructed to wear for another 2 weeks. Pt is right hand dominant. Pt did state 2 incidences when her L hand has been purple/blue with pins and needles feeling.     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     Lifestyle: horses, running, 2 dogs, hiking     Living Environment: lives with family     Precautions:  Robles small RCR     Pain:  Description of Symptoms:    Pain: [Detail the type of pain: sharp, dull, throbbing, aching, etc.]  Location: [Specify the area of pain or discomfort]  Severity: current- 2-3/10  At worst- 3/10  At best-0/10  Intensity Variation:   Duration of Pain: intermittent   Aggravating Factors: n/a    Relieving Factors: ice, OTC pain   Associated Symptoms: numbness and tingling into hands       Reviewed medical screening form with pt and medical screening assessed    Imaging:     Objective   Shoulder Musculoskeletal Exam    Inspection    Left      Incision: clean, dry and intact      Incisional drainage: none    Inspection additional comments: L hand - slow capillary refill and colder temperature vs R hand     Palpation    Left      Tenderness: present        Rotator cuff: mild        Proximal biceps: mild        Lateral arm: mild        Elbow: mild    Range of Motion    Left      Active ROM comment: AROM NOT TESTED.       Passive ROM: no pain.       Passive forward elevation: 70.       Passive abduction: 50.       Active external rotation in abduction: 10.       Passive internal rotation at 90 degrees: NT.     Strength    Right      Right shoulder strength is normal.     Strength additional comments: LUE-NT D/T PROTOCOL         Outcome Measures:  Other Measures  Disability of Arm Shoulder Hand (DASH): 52     Treatment:   Measures for eval     EDUCATION/HEP:  Access Code: [email or web address]  URL: [email or web address]  Date: 12/23/2024  Prepared by: Delaney Yang    Exercises  - Standing Single Arm Bicep Curl  - 1 x daily - 4 x weekly - 2 sets - 10 reps  - Putty Squeezes  - 1 x daily - 4 x weekly - 2 sets - 10 reps  - Seated Forearm Pronation and Supination AROM  - 1 x daily - 4 x weekly - 2 sets - 10 reps    -reviewed shoulder precautions     Assessment:  Pt reports to the clinic s/p left shoulder arthroscopy with SLAP repair, biceps tenodesis, subacromial decompression, rotator cuff repair on 12/6/24 with Dr. Robles. Pt elected for surgery after lifting heavy wagon last fall and she thought she pulled something. Patient denies any abnormal/calf pain, acute/abnormal lower leg swelling, shortness of breath or any overt signs of DVT/PE. Patient also denies any acute irritation of surgical site, no  odorous/abnormally colored discharge or overt signs of infection. Denies any falls, /GI changes, fever/night sweats/pain, loss of appetite, or unintentional weight loss/gain. Pt is still donned in L shoulder sling, that she has been instructed to wear for another 2 weeks. Pt is right hand dominant. She does have some right sided neck pain d/t sling so I did loosen it a little for her and showed her a friendlier way to don/doff it.  Pt did state 2 incidences when her L hand has been purple/blue with pins and needles feeling. Upon inspection, there is a colder temperature change with slower capillary refill and I contacted ortho about this. During PROM, pt displayed a good amount of muscular guarding, but no increase in pain. Pt demoed understanding of shoulder precautions. She stands to benefit from skilled PT in order to increase strength, ROM, and stability of L shoulder in order to return to PLOF.     Clinical Presentation: Stable    Level of Complexity: Low     Goals:  Pt will report at least 75% improvement in L shoulder pain during everyday activities.  Pt will demo >/= 4+/5 strength of L shoulder musculature without pain.  Pt will demo full and symmetrical AROM of cervical spine without pain.  Pt will demo symmetrical A/PROM of L shoulder to  without pain.  Pt will improve Quick DASH score by at least 20% (MCID) to indicate a significant improvement in overall function.   Pt will demo independence and report compliance with HEP.      Plan  1 x a week for 6 visits     Skilled therapeutic intervention to address the above mentioned physical and functional impairments and limitations including, but not limited to: patient education, therapeutic exercise, therapeutic activity, manual therapy, body mechanics training, dry needling, blood flow restriction training, instrumented soft tissue mobilization, manual soft tissue mobilization, gait retraining, biofeedback, cryotherapy, electrical stimulation, home program  development, hot pack, taping, neuromuscular re-education, self-care/home management, and vasopneumatic compression.

## 2024-12-30 ENCOUNTER — TREATMENT (OUTPATIENT)
Dept: PHYSICAL THERAPY | Facility: CLINIC | Age: 64
End: 2024-12-30
Payer: COMMERCIAL

## 2024-12-30 DIAGNOSIS — M25.512 LEFT SHOULDER PAIN: ICD-10-CM

## 2024-12-30 DIAGNOSIS — M25.512 ACUTE PAIN OF LEFT SHOULDER: Primary | ICD-10-CM

## 2024-12-30 PROCEDURE — 97110 THERAPEUTIC EXERCISES: CPT | Mod: GP

## 2024-12-30 PROCEDURE — 97140 MANUAL THERAPY 1/> REGIONS: CPT | Mod: GP

## 2024-12-30 NOTE — PROGRESS NOTES
Physical Therapy Treatment    Patient Name: Eli Matthew  MRN: [id number]  Today's Date: 12/30/2024  Time Calculation  Start Time: 0745  Stop Time: 0820  Time Calculation (min): 35 min     PT Therapeutic Procedures Time Entry  Manual Therapy Time Entry: 20  Therapeutic Exercise Time Entry: 15                   Current Problem  1. Acute pain of left shoulder        2. Left shoulder pain  Follow Up In Physical Therapy            Subjective     Insurance  Visit number: 1  ANTHEM 30V ( 29 REMAIN PT ) COPAY 0 DED 5000(4850),COVERAGE 80 OOP 0 NO AUTH REQ,RE    Fall Risk:         General   Pt states she had a bad night last night and states her pain was around 7/10. She states she had to get up and take a tylenol.     Precautions   Robles RCR, SLAP repair, biceps tenodesis, SAD 12/6/24  3 weeks 3 days today  Vital Signs       Pain       Ortho  Objective     PROM   Flex 0-85  Abd 0-50  ER 0-25      Treatments:  Scap retraction 2x10  Pronation/supination AROM  Chin Tucks 2x10    PROM x20 mins  Flex, Abd, ER     OP EDUCATION:   Access Code: [email or web address]  URL: [email or web address]  Date: 12/30/2024  Prepared by: Arleen Desai    Exercises  - Seated Scapular Retraction  - 2-3 x daily - 7 x weekly - 1-2 sets - 10 reps  - Seated Cervical Retraction  - 2-3 x daily - 7 x weekly - 1-2 sets - 10 reps    Assessment:   Pt tolerated treatment session well today. Able to progress PROM today with good tolerance and pain relief. Pt overall still limited in ROM in all planes. Added scap retractions and chin tucks for postural awareness with good tolerance. At this time pt is progressing appropriately within protocol. Pt will continue to benefit from skilled therapy in order to reach her goals.    Plan:   Continue to progress as tolerated with focus on ROM, postural awareness.

## 2024-12-31 ENCOUNTER — TELEPHONE (OUTPATIENT)
Dept: ORTHOPEDIC SURGERY | Facility: CLINIC | Age: 64
End: 2024-12-31
Payer: COMMERCIAL

## 2024-12-31 NOTE — TELEPHONE ENCOUNTER
Received VM from patient short term disability ([phone] ext. [phone]) they need to confirm her return to work date they have her returning 1/6/25 message left for patient to return call to let us know her return to work date

## 2025-01-08 ENCOUNTER — TREATMENT (OUTPATIENT)
Dept: PHYSICAL THERAPY | Facility: CLINIC | Age: 65
End: 2025-01-08
Payer: COMMERCIAL

## 2025-01-08 DIAGNOSIS — M25.512 LEFT SHOULDER PAIN, UNSPECIFIED CHRONICITY: ICD-10-CM

## 2025-01-08 DIAGNOSIS — M25.512 ACUTE PAIN OF LEFT SHOULDER: Primary | ICD-10-CM

## 2025-01-08 DIAGNOSIS — M25.512 LEFT SHOULDER PAIN: ICD-10-CM

## 2025-01-08 PROCEDURE — 97140 MANUAL THERAPY 1/> REGIONS: CPT | Mod: GP,CQ

## 2025-01-08 PROCEDURE — 97110 THERAPEUTIC EXERCISES: CPT | Mod: GP,CQ

## 2025-01-08 ASSESSMENT — PAIN - FUNCTIONAL ASSESSMENT: PAIN_FUNCTIONAL_ASSESSMENT: 0-10

## 2025-01-08 ASSESSMENT — PAIN SCALES - GENERAL: PAINLEVEL_OUTOF10: 0 - NO PAIN

## 2025-01-08 NOTE — PROGRESS NOTES
"Physical Therapy Treatment    Patient Name: Eli Matthew  MRN: [id number]  Today's Date: 1/8/2025  Time Calculation  Start Time: 0835  Stop Time: 0915  Time Calculation (min): 40 min     PT Therapeutic Procedures Time Entry  Manual Therapy Time Entry: 25  Therapeutic Exercise Time Entry: 15             s/p left shoulder arthroscopy with SLAP repair, biceps tenodesis, subacromial decompression, rotator cuff repair on 12/6/24 with Dr. Robles      Insurance:   Visit number: 2  ANTHEM 30V ( 28 REMAIN PT ) COPAY 0 DED 5000(3627),COVERAGE 80 OOP 0 NO AUTH REQ,RE  Assessment:  Pt 4.5 weeks post-op L SLAP Repair, biceps tenodesis, SAD and RCR. Major emphasis of today's session on PROM per protocol. Pt w/ a lot of tightness through L shoulder w/ PROM and some guarding initially. She was able to relax better after cues from therapist. Improvement made in pt's PROM this visit however it is still quite limited at this time. Reviewed ex's as part of HEP and discussed what to avoid at this time. Pt demo's good understanding of current restrictions and current HEP.     Plan:    Continue to progress as tolerated with focus on ROM, postural awareness.     Current Problem  1. Acute pain of left shoulder        2. Left shoulder pain, unspecified chronicity        3. Left shoulder pain  Follow Up In Physical Therapy          Subjective   General   Pt states the shoulder is \"ok.\" No current pain. \"If I move it wrong it talks to me but nothing bad.\" Pt states she hasn't taken many meds recently.   Pt compliant w/ given HEP.   Precautions       Pain  Pain Assessment: 0-10  0-10 (Numeric) Pain Score: 0 - No pain    Objective   12/30  PROM   Flex 0-85  Abd 0-50  ER 0-25    1/8  PROM  Flex 90   Abd 75 deg  ER 30 deg    Treatments:  Therapeutic Exercise (91610):   Scap retraction 2x10  Pronation/supination AROM  Chin Tucks 2x10  Isometric Shldr - Initiate NV as per Protocol  AAROM ex's - Initiate NV as per Protocol     Manual (55281):  PROM " x25 mins  Flex, Abd, ER     EDUCATION:

## 2025-01-13 ENCOUNTER — TREATMENT (OUTPATIENT)
Dept: PHYSICAL THERAPY | Facility: CLINIC | Age: 65
End: 2025-01-13
Payer: COMMERCIAL

## 2025-01-13 DIAGNOSIS — M25.512 LEFT SHOULDER PAIN: ICD-10-CM

## 2025-01-13 PROCEDURE — 97110 THERAPEUTIC EXERCISES: CPT | Mod: GP

## 2025-01-13 PROCEDURE — 97140 MANUAL THERAPY 1/> REGIONS: CPT | Mod: GP

## 2025-01-13 NOTE — PROGRESS NOTES
Physical Therapy Treatment    Patient Name: Eli Matthew  MRN: [id number]  Time Calculation  Start Time: 1045  Stop Time: 1130  Time Calculation (min): 45 min     PT Therapeutic Procedures Time Entry  Manual Therapy Time Entry: 15  Therapeutic Exercise Time Entry: 30                   Current Problem  1. Left shoulder pain  Follow Up In Physical Therapy      Insurance:   Visit number: 3/6  ANTHEM 30V ( 28 REMAIN PT ) COPAY 0 DED 5000(4850),COVERAGE 80 OOP 0 NO AUTH REQ,RE    Subjective   General  Pt presents with no significant changes since last visit. Her LUE temperature and tingling has improved slightly. She has c/o some twinge near biceps intermittently, but not too bad. She presents with L sling donned.      Precautions  5 weeks post-op L SLAP Repair, biceps tenodesis, SAD and RCR- Robles   Can perform AAROM/PROM, isometrics, cleared for bicep flexion with no weight     Pain  0/10      Objective     Treatments:  Manual: PROM to GHJ to pt tolerance     Elbow extension with cane x 15     Access Code: [email or web address]  URL: [email or web address]  Date: 01/13/2025  Prepared by: Delaney Yang    Exercises  - Putty Squeezes  - 1 x daily - 4 x weekly - 2 sets - 10 reps  - Seated Forearm Pronation and Supination AROM  - 1 x daily - 4 x weekly - 2 sets - 10 reps  - Supine Shoulder Flexion Extension AAROM with Dowel  - 1 x daily - 4 x weekly - 2 sets - 10 reps  - Supine Shoulder External Rotation with Dowel  - 1 x daily - 4 x weekly - 2 sets - 10 reps  - Seated Shoulder Flexion Towel Slide at Table Top  - 1 x daily - 4 x weekly - 2 sets - 10 reps  - Supine Shoulder Press AAROM in Abduction with Dowel  - 1 x daily - 4 x weekly - 2 sets - 10 reps  - Seated Scapular Retraction  - 1 x daily - 4 x weekly - 2 sets - 10 reps  - Seated Cervical Retraction  - 1 x daily - 4 x weekly - 2 sets - 10 reps      Assessment   Pt presents with no significant changes since last visit. Her LUE temperature and tingling has  improved slightly. She has c/o some twinge near biceps intermittently, but not too bad. She presents with L sling donned.  Pt was able to tolerate and progress to PROM to tolerance and AAROM today with no complaints. HEP was updated to reflect progression. Her next f/u with ortho is on 1/21/25. Pt continues to benefit from skilled therapy in order to continue to progress towards her goals.     Plan   Continue current POC, progress as tolerated

## 2025-01-15 ENCOUNTER — TELEPHONE (OUTPATIENT)
Dept: CARDIOLOGY | Facility: HOSPITAL | Age: 65
End: 2025-01-15
Payer: COMMERCIAL

## 2025-01-15 NOTE — TELEPHONE ENCOUNTER
Received phone call from [facility] ([place]) in regards to Nuclear stress test ordered by Dr. Rey and preformed on 11/18/24.     Case has been denied ad they are requesting further clinic document ion be sent to fax [phone]. Or a physician could call [phone] option 2 to discuss further options to appeal.     Routing to care team for further assistance.    Thank you!  Markus TESFAYE

## 2025-01-20 ENCOUNTER — TREATMENT (OUTPATIENT)
Dept: PHYSICAL THERAPY | Facility: CLINIC | Age: 65
End: 2025-01-20
Payer: COMMERCIAL

## 2025-01-20 DIAGNOSIS — M25.512 LEFT SHOULDER PAIN: ICD-10-CM

## 2025-01-20 PROCEDURE — 97110 THERAPEUTIC EXERCISES: CPT | Mod: GP

## 2025-01-20 PROCEDURE — 97140 MANUAL THERAPY 1/> REGIONS: CPT | Mod: GP

## 2025-01-20 NOTE — PROGRESS NOTES
Physical Therapy Treatment    Patient Name: Eli Matthew  MRN: [id number]  Time Calculation  Start Time: 0955  Stop Time: 1040  Time Calculation (min): 45 min     PT Therapeutic Procedures Time Entry  Manual Therapy Time Entry: 15  Therapeutic Exercise Time Entry: 30                   Current Problem  1. Left shoulder pain  Follow Up In Physical Therapy      Insurance:   Visit number: 4/6  ANTHEM 30V ( 28 REMAIN PT ) COPAY 0 DED 5000(4850),COVERAGE 80 OOP 0 NO AUTH REQ,RE    Subjective   General  Pt states that her L shoulder has had some intermittent flare ups in pain but overall she is trying to do more and more and has returned back to work this week.     Precautions  6 weeks post-op L SLAP Repair, biceps tenodesis, SAD and RCR- Robles   Can perform AAROM/PROM, isometrics, cleared for bicep flexion with no weight     Pain  2/10    Objective     Treatments:  UBE fwd 4 min     Supine shoulder flexion AAROM 2 x 10   Supine shoulder ER AAROM 2 x10   Supine shoulder Abd AAROM 2 x 10   Supine chest press AAROM 2 x 10   AAROM elbow extension 2 x 10   3 way shoulder isometrics 8x 10s    Access Code: [email or web address]  URL: [email or web address]  Date: 01/20/2025  Prepared by: Delaney Yang    Exercises  - Putty Squeezes  - 1 x daily - 4 x weekly - 2 sets - 10 reps  - Seated Forearm Pronation and Supination AROM  - 1 x daily - 4 x weekly - 2 sets - 10 reps  - Supine Shoulder Flexion Extension AAROM with Dowel  - 1 x daily - 4 x weekly - 2 sets - 10 reps  - Supine Shoulder External Rotation with Dowel  - 1 x daily - 4 x weekly - 2 sets - 10 reps  - Seated Shoulder Flexion Towel Slide at Table Top  - 1 x daily - 4 x weekly - 2 sets - 10 reps  - Supine Shoulder Press AAROM in Abduction with Dowel  - 1 x daily - 4 x weekly - 2 sets - 10 reps  - Seated Scapular Retraction  - 1 x daily - 4 x weekly - 2 sets - 10 reps  - Seated Cervical Retraction  - 1 x daily - 4 x weekly - 2 sets - 10 reps  - Isometric  Shoulder Flexion at Wall  - 1 x daily - 4 x weekly - 10 reps - 10 hold  - Standing Isometric Shoulder Internal Rotation at Doorway  - 1 x daily - 4 x weekly - 10 reps - 10 hold  - Standing Isometric Shoulder External Rotation with Doorway  - 1 x daily - 4 x weekly - 10 reps - 10 hold    Assessment   Pt states that her L shoulder has had some intermittent flare ups in pain but overall she is trying to do more and more and has returned back to work this week. Shoulder AAROM exercises pt was instructed to not push through pain and was able to tolerate progressing to shoulder isometrics today.     Plan   Continue current POC, progress as tolerated

## 2025-01-21 ENCOUNTER — OFFICE VISIT (OUTPATIENT)
Dept: PRIMARY CARE | Facility: CLINIC | Age: 65
End: 2025-01-21
Payer: COMMERCIAL

## 2025-01-21 ENCOUNTER — LAB (OUTPATIENT)
Dept: LAB | Facility: LAB | Age: 65
End: 2025-01-21
Payer: COMMERCIAL

## 2025-01-21 ENCOUNTER — APPOINTMENT (OUTPATIENT)
Dept: ORTHOPEDIC SURGERY | Facility: CLINIC | Age: 65
End: 2025-01-21
Payer: COMMERCIAL

## 2025-01-21 VITALS
RESPIRATION RATE: 16 BRPM | TEMPERATURE: 97.4 F | HEART RATE: 69 BPM | OXYGEN SATURATION: 100 % | DIASTOLIC BLOOD PRESSURE: 70 MMHG | BODY MASS INDEX: 22.68 KG/M2 | WEIGHT: 125 LBS | SYSTOLIC BLOOD PRESSURE: 118 MMHG

## 2025-01-21 DIAGNOSIS — K92.1 HEMATOCHEZIA: Primary | ICD-10-CM

## 2025-01-21 DIAGNOSIS — K92.1 HEMATOCHEZIA: ICD-10-CM

## 2025-01-21 LAB
ANION GAP SERPL CALC-SCNC: 13 MMOL/L (ref 10–20)
BASOPHILS # BLD AUTO: 0.04 X10*3/UL (ref 0–0.1)
BASOPHILS NFR BLD AUTO: 0.4 %
BUN SERPL-MCNC: 6 MG/DL (ref 6–23)
CALCIUM SERPL-MCNC: 9.5 MG/DL (ref 8.6–10.6)
CHLORIDE SERPL-SCNC: 101 MMOL/L (ref 98–107)
CO2 SERPL-SCNC: 29 MMOL/L (ref 21–32)
CREAT SERPL-MCNC: 0.69 MG/DL (ref 0.5–1.05)
EGFRCR SERPLBLD CKD-EPI 2021: >90 ML/MIN/1.73M*2
EOSINOPHIL # BLD AUTO: 0.21 X10*3/UL (ref 0–0.7)
EOSINOPHIL NFR BLD AUTO: 2.2 %
ERYTHROCYTE [DISTWIDTH] IN BLOOD BY AUTOMATED COUNT: 12.3 % (ref 11.5–14.5)
GLUCOSE SERPL-MCNC: 105 MG/DL (ref 74–99)
HCT VFR BLD AUTO: 38 % (ref 36–46)
HGB BLD-MCNC: 13.1 G/DL (ref 12–16)
IMM GRANULOCYTES # BLD AUTO: 0.03 X10*3/UL (ref 0–0.7)
IMM GRANULOCYTES NFR BLD AUTO: 0.3 % (ref 0–0.9)
LYMPHOCYTES # BLD AUTO: 2.24 X10*3/UL (ref 1.2–4.8)
LYMPHOCYTES NFR BLD AUTO: 23.7 %
MCH RBC QN AUTO: 31.3 PG (ref 26–34)
MCHC RBC AUTO-ENTMCNC: 34.5 G/DL (ref 32–36)
MCV RBC AUTO: 91 FL (ref 80–100)
MONOCYTES # BLD AUTO: 0.59 X10*3/UL (ref 0.1–1)
MONOCYTES NFR BLD AUTO: 6.3 %
NEUTROPHILS # BLD AUTO: 6.33 X10*3/UL (ref 1.2–7.7)
NEUTROPHILS NFR BLD AUTO: 67.1 %
NRBC BLD-RTO: 0 /100 WBCS (ref 0–0)
PLATELET # BLD AUTO: 225 X10*3/UL (ref 150–450)
POTASSIUM SERPL-SCNC: 4.2 MMOL/L (ref 3.5–5.3)
RBC # BLD AUTO: 4.18 X10*6/UL (ref 4–5.2)
SODIUM SERPL-SCNC: 139 MMOL/L (ref 136–145)
WBC # BLD AUTO: 9.4 X10*3/UL (ref 4.4–11.3)

## 2025-01-21 PROCEDURE — 80048 BASIC METABOLIC PNL TOTAL CA: CPT

## 2025-01-21 PROCEDURE — 1036F TOBACCO NON-USER: CPT | Performed by: INTERNAL MEDICINE

## 2025-01-21 PROCEDURE — 85025 COMPLETE CBC W/AUTO DIFF WBC: CPT

## 2025-01-21 PROCEDURE — 99214 OFFICE O/P EST MOD 30 MIN: CPT | Performed by: INTERNAL MEDICINE

## 2025-01-21 RX ORDER — PANTOPRAZOLE SODIUM 40 MG/1
40 TABLET, DELAYED RELEASE ORAL 2 TIMES DAILY
Qty: 60 TABLET | Refills: 1 | Status: SHIPPED | OUTPATIENT
Start: 2025-01-21 | End: 2025-01-21

## 2025-01-21 RX ORDER — PANTOPRAZOLE SODIUM 40 MG/1
40 TABLET, DELAYED RELEASE ORAL 2 TIMES DAILY
Qty: 60 TABLET | Refills: 1 | Status: SHIPPED | OUTPATIENT
Start: 2025-01-21 | End: 2025-03-22

## 2025-01-21 ASSESSMENT — ENCOUNTER SYMPTOMS
NAUSEA: 0
DIARRHEA: 1
BLOOD IN STOOL: 1
ABDOMINAL PAIN: 1

## 2025-01-21 NOTE — PROGRESS NOTES
Patient here for food poison/ GI issues. Diarrhea and bloody stool     Subjective   Patient ID: Eli Matthew is a 64 y.o. female who presents for GI Problem and Diarrhea.    The patient reports a recent bout of acute onset of abdominal cramping, diarrhea, and successive hematochezia, dominique blood, followed by melena.  Symptoms began on the morning of 1/20/2025 following a dinner of pad Hebrew the previous evening.  She has been taking Naproxen since 12/2024 following shoulder arthroscopy.  She has discontinued the medication, and instead started proton pump inhibitors which resulted in some relief.  She also has been taking baby aspirin daily.      The patient is eating a bland diet of saltine crackers, and drinks sufficient water to remain well hydrated.  Although the loose stool is ongoing with three bowel movements thus far, the melena and abdominal cramping seem to be improved today. The patient completed the last colonoscopy in 11/2022, and will be due for repeat 11/2029.  She denies any nausea currently- though she did have some initially.  No abdominal pain today.    GI Problem  The primary symptoms include abdominal pain and diarrhea. Primary symptoms do not include nausea.   Diarrhea   Associated symptoms include abdominal pain.     Review of Systems   Gastrointestinal:  Positive for abdominal pain, blood in stool and diarrhea. Negative for nausea.     Objective   Physical Exam  Constitutional:       Appearance: Normal appearance.   Neck:      Vascular: No carotid bruit.   Cardiovascular:      Rate and Rhythm: Normal rate and regular rhythm.      Heart sounds: Normal heart sounds.   Pulmonary:      Effort: Pulmonary effort is normal.      Breath sounds: Normal breath sounds.   Abdominal:      General: Bowel sounds are normal.      Palpations: Abdomen is soft.      Tenderness: There is no abdominal tenderness.   Skin:     General: Skin is warm and dry.   Neurological:      General: No focal deficit present.       Mental Status: She is alert and oriented to person, place, and time. Mental status is at baseline.   Psychiatric:         Mood and Affect: Mood normal.         Behavior: Behavior normal.     Assessment/Plan   Problem List Items Addressed This Visit    None  Visit Diagnoses         Codes    Hematochezia    -  Primary K92.1    Relevant Medications    pantoprazole (ProtoNix) 40 mg EC tablet    Other Relevant Orders    Stool Pathogen Panel, PCR    Ova/Para + Giardia/Cryptosporidium Antigen    C. difficile, PCR    CBC and Auto Differential    Basic metabolic panel            IMPRESSION/PLAN:    Diarrhea/Hematochezia  - Vitals stable.  Soft abdomen.  No tenderness, rebound, or guarding.  Normal bowel sounds throughout.  Suspect possible upper GI ulcer vs. Infectious cause (less likely).  Prescribed pantoprazole 40mg twice daily.  Advised patient to hold off Naproxen and ASA.  Continue with bland diet for now.  Ordered CBC with differential, BMP, Stool Pathogen Panel, Ova/Parasite Giardia/Cryptosporidium Antigen, C.difficile PCR.  Instructed patient to head to the ER with any worsening, and patient verbalized agreement.  Call the clinic with any concerns.    BP at 118/70 today in office.     HLD   - Cholesterol and LDL borderline high per 3/2023.  Continue with atorvastatin 20 mg once daily.  ASCVD Risk 5.4% per 8/2023.  Previously ordered CMP and lipid panel to be completed in the fasting state.     Family History CAD  - Nuclear Stress Test 11/2024 showed no signs of ischemia.  Following  from Cardiology.     Hypersensitivity pneumonitis  - Last CT Chest 12/2022 showed interval resolution with marked improvement compared to previous imaging from 4/2022.  She is following with pulm (Dr. Ocampo) as well. On exam- lungs were clear.      Post-menopause   - Takes Estradiol 10 mcg vaginal tablet as directed. Follows with Dr. Hammond in Gynecology.       Left Shoulder Pain  - Previously prescribed meloxicam 15mg  once daily prn, and advised to take with food to avoid adverse effects. Xray left shoulder 1/2024 unremarkable.  Previously ordered referral to Physical Therapy.  Following  from Orthopedic Surgery.      Chronic Left Knee Pain  - Prescribed meloxicam 15mg once daily prn, and advised to take with food to avoid adverse effects.   Xray left knee 1/2024 unremarkable.  Previously ordered referral to Physical Therapy.  Call the clinic if symptoms persist or worsen, and will consider referral to  from Orthopedic Surgery.     Weight Loss  -  Patient lost weight on her own with diet and active lifestyle.  Advised to call the clinic if she continues to lose weight.  Previously prescribed semaglutide 0.25mg/0.5ml as 0.25 mg once weekly.  But unable to take due to issues with coverage.     Health Maintenance   - Routine labs 10/2024. Last Pap 1/2024.  Last Mammogram 8/2024.  Last colonoscopy 11/2022, repeat due 11/2029. Patient prefers to repeat colonoscopy in 2027 instead.     Follow up in 6 months, call sooner if needed.       Scribe Attestation  By signing my name below, I, Amy Perkins, Sarahi   attest that this documentation has been prepared under the direction and in the presence of Juan Harris DO.   Amy Perkins 01/21/25 1:30 PM

## 2025-01-22 ENCOUNTER — OFFICE VISIT (OUTPATIENT)
Dept: ORTHOPEDIC SURGERY | Facility: CLINIC | Age: 65
End: 2025-01-22
Payer: COMMERCIAL

## 2025-01-22 DIAGNOSIS — G89.18 POST-OP PAIN: Primary | ICD-10-CM

## 2025-01-22 DIAGNOSIS — M75.02 ADHESIVE CAPSULITIS OF LEFT SHOULDER: ICD-10-CM

## 2025-01-22 PROCEDURE — 99211 OFF/OP EST MAY X REQ PHY/QHP: CPT | Performed by: STUDENT IN AN ORGANIZED HEALTH CARE EDUCATION/TRAINING PROGRAM

## 2025-01-22 PROCEDURE — 99024 POSTOP FOLLOW-UP VISIT: CPT | Performed by: STUDENT IN AN ORGANIZED HEALTH CARE EDUCATION/TRAINING PROGRAM

## 2025-01-22 PROCEDURE — 1036F TOBACCO NON-USER: CPT | Performed by: STUDENT IN AN ORGANIZED HEALTH CARE EDUCATION/TRAINING PROGRAM

## 2025-01-22 ASSESSMENT — PAIN - FUNCTIONAL ASSESSMENT: PAIN_FUNCTIONAL_ASSESSMENT: NO/DENIES PAIN

## 2025-01-22 NOTE — PROGRESS NOTES
History of Present Illness:  Patient returns today after shoulder arthroscopy.  The patient denies any significant pain at rest but does have pain with motion and activities.     She is going on vacation in late February to [place], she will be doing some hiking while she is away.     Physical Examination:  Left shoulder  Well healed incisions, no significant swelling   Significantly limited forward flexion to about 90 degrees, internal rotation limited to lateral beltline, external rotation limited to 5 degrees.   Contralateral side range of motion full forward flexion, T-spine internal rotation, 45 degree external rotation.  Neurovascular exam normal distally    Assessment:  Patient status post left shoulder arthroscopy with rotator cuff repair, biceps tenodesis, subacromial decompression.  New onset adhesive capsulitis left shoulder.    Plan:  Discussed transition out of sling.  Discussed physical therapy protocol and activity restrictions.  We highly encouraged dedicated physical therapy for adhesive capsulitis to put emphasis on passive range of motion.  We do feel that active range of motion is appropriate however given her lack of passive range of motion she may struggle with this.  Recommended transitioning physical therapy protocol to adhesive capsulitis protocol, with continuing to pain mind lifting restrictions per RCR protocol.  Follow-up 6-8  weeks.    Han Ortez PA-C

## 2025-01-27 ENCOUNTER — TREATMENT (OUTPATIENT)
Dept: PHYSICAL THERAPY | Facility: CLINIC | Age: 65
End: 2025-01-27
Payer: COMMERCIAL

## 2025-01-27 DIAGNOSIS — M25.512 ACUTE PAIN OF LEFT SHOULDER: Primary | ICD-10-CM

## 2025-01-27 PROCEDURE — 97110 THERAPEUTIC EXERCISES: CPT | Mod: GP

## 2025-01-27 PROCEDURE — 97140 MANUAL THERAPY 1/> REGIONS: CPT | Mod: GP

## 2025-01-27 NOTE — PROGRESS NOTES
Physical Therapy Treatment    Patient Name: Eli Matthew  MRN: [id number]  Time Calculation  Start Time: 1100  Stop Time: 1140  Time Calculation (min): 40 min     PT Therapeutic Procedures Time Entry  Manual Therapy Time Entry: 10  Therapeutic Exercise Time Entry: 30                   Current Problem  1. Acute pain of left shoulder          Insurance:   Visit number: 5/6  ANTHEM 30V ( 28 REMAIN PT ) COPAY 0 DED 5000(4850),COVERAGE 80 OOP 0 NO AUTH REQ,RE    Subjective   General  Pt presents to the clinic with some changes in L shoulder. She was told she is allowed to wean out of sling and does not present with it today and has been doing well with it. She also had to have an emergency endoscopy d/t intake of NSAIDs. Returning to work has been okay for her.     Precautions  L SLAP Repair, biceps tenodesis, SAD and RCR- Robles   Can perform AAROM/PROM, isometrics, cleared for bicep flexion with no weight     Pain  0/10    Objective     Treatments:  UBE- 5 mins     PROM: GHJ to pt tolerance     Supine AAROM flex/ER/abd x 15 with cane     Access Code: [email or web address]  URL: [email or web address]  Date: 01/27/2025  Prepared by: Delaney Yang    Exercises  - Supine Shoulder Flexion Extension AAROM with Dowel  - 1 x daily - 4 x weekly - 2 sets - 10 reps  - Supine Shoulder External Rotation with Dowel  - 1 x daily - 4 x weekly - 2 sets - 10 reps  - Seated Shoulder Flexion Towel Slide at Table Top  - 1 x daily - 4 x weekly - 2 sets - 10 reps  - Supine Shoulder Press AAROM in Abduction with Dowel  - 1 x daily - 4 x weekly - 2 sets - 10 reps  - Isometric Shoulder Flexion at Wall  - 1 x daily - 4 x weekly - 10 reps - 10 hold  - Standing Isometric Shoulder Internal Rotation at Doorway  - 1 x daily - 4 x weekly - 10 reps - 10 hold  - Standing Isometric Shoulder External Rotation with Doorway  - 1 x daily - 4 x weekly - 10 reps - 10 hold  - Sidelying Shoulder External Rotation  - 1 x daily - 4 x weekly -  2 sets - 10 reps  - Sidelying Shoulder Abduction Palm Forward  - 1 x daily - 4 x weekly - 2 sets - 10 reps  - Sidelying Shoulder Flexion 15 Degrees  - 1 x daily - 4 x weekly - 2 sets - 10 reps  - Sidelying Shoulder Horizontal Abduction  - 1 x daily - 4 x weekly - 2 sets - 10 reps    Assessment   Pt able to tolerate session with well today. AAROM/PROM continues to improve each visit and shoulder overall has less pain. AROM exercises added today in sidelying to begin working strength without resistance today. HEP updated to reflect this.     Plan   Continue current POC, progress as tolerated

## 2025-02-03 ENCOUNTER — TREATMENT (OUTPATIENT)
Dept: PHYSICAL THERAPY | Facility: CLINIC | Age: 65
End: 2025-02-03
Payer: COMMERCIAL

## 2025-02-03 DIAGNOSIS — M25.512 ACUTE PAIN OF LEFT SHOULDER: Primary | ICD-10-CM

## 2025-02-03 PROCEDURE — 97110 THERAPEUTIC EXERCISES: CPT | Mod: GP | Performed by: PHYSICAL THERAPIST

## 2025-02-03 PROCEDURE — 97140 MANUAL THERAPY 1/> REGIONS: CPT | Mod: GP | Performed by: PHYSICAL THERAPIST

## 2025-02-03 NOTE — PROGRESS NOTES
Physical Therapy Treatment    Patient Name: Eli Matthew  MRN: [id number]  Today's Date: 2/3/2025  Time Calculation  Start Time: 1130  Stop Time: 1215  Time Calculation (min): 45 min       PT Therapeutic Procedures Time Entry  Manual Therapy Time Entry: 15  Therapeutic Exercise Time Entry: 28                   INSURANCE:  Visit number: 6/6  ANTHEM 30V ( 28 REMAIN PT ) COPAY 0 DED 5000(4850),COVERAGE 80 OOP 0 NO AUTH REQ,RE     CURRENT PROBLEM:  1. Acute pain of left shoulder          SUBJECTIVE:   General -   Pt is doing home exercises. Pain mostly at night.    Pain -    Pain Location/Description: L shoulder  Pain Today: 0/10  Pain Worst: 4/10     Precautions -    L SLAP Repair, biceps tenodesis, SAD and RCR- Robles   Can perform AAROM/PROM, isometrics, cleared for bicep flexion with no weight    OBJECTIVE:     Shoulder PROM -  L shoulder Flexion: 125 with empty end feel   L shoulder Abduction: 80 with empty end feel   L shoulder ER: 42 with empty end feel   L shoulder IR: 47 with empty end feel    Treatment -   Therapeutic Exercise (95396) -  UBE F/R: 3 min ea  Supine AAROM flex/ER/abd: x15 with cane   Low doorway stretch: 30s  UT/Levator Stretches: 30s ea B  Table slides Flexion/Scaption: x15 ea  Manual Therapy (74310) -    PROM to L shoulder  STM to UT/levator, pectorals, biceps    OP Patient Education -   HEP2Go: UT/Levator stretches, Low Doorway stretch, scaption table slides    ASSESSMENT:     PROM and STM performed for improved PROM of the L shoulder, tightness noted in pectorals during AB and ER motions especially. Pt was introduced to cervical and pectoral stretching for improved motion. Pt was able to progress to scaption table slides this visit with good tolerance. Pt tolerated session well and is progressing towards functional needs. Continue to progress pt within tolerance and POC.    PLAN:    Continue to progress pt within tolerance. Plan to re-assess pt next visit.

## 2025-02-10 ENCOUNTER — TREATMENT (OUTPATIENT)
Dept: PHYSICAL THERAPY | Facility: CLINIC | Age: 65
End: 2025-02-10
Payer: COMMERCIAL

## 2025-02-10 DIAGNOSIS — M25.512 ACUTE PAIN OF LEFT SHOULDER: Primary | ICD-10-CM

## 2025-02-10 PROCEDURE — 97110 THERAPEUTIC EXERCISES: CPT | Mod: GP

## 2025-02-10 PROCEDURE — 97140 MANUAL THERAPY 1/> REGIONS: CPT | Mod: GP

## 2025-02-10 NOTE — PROGRESS NOTES
Physical Therapy Treatment    Patient Name: Nicolas Matthew  MRN: [id number]  Time Calculation  Start Time: 0745  Stop Time: 0835  Time Calculation (min): 50 min     PT Therapeutic Procedures Time Entry  Manual Therapy Time Entry: 15  Therapeutic Exercise Time Entry: 35                   Current Problem  1. Acute pain of left shoulder          INSURANCE:  Visit number: 7/11  ANTHEM 30V ( 28 REMAIN PT ) COPAY 0 DED 5000(4850),COVERAGE 80 OOP 0 NO AUTH REQ,RE   Subjective   General  Pt c/o night pain every night that goes away with STM. She states that she can sometimes sleep on it on accident. Otherwise, having no other difficulties.     Precautions  L SLAP Repair, biceps tenodesis, SAD and RCR- Robles   Can perform AAROM/PROM, isometrics, cleared for bicep flexion with no weight     Pain  0/10    Objective     Treatments:  UBE 2/2 fwd/bwd     Manual: PROM GHJ to pt tolerance       Flexion table slides + lift 2 x 10   Scaption table slides 2 x 10  Cane flexion/abd/ER 2 x 10   SL 3 way jobes 2 x 10   Shoulder protraction 3 x 10   IR with overhead press 10s x 10   SA punch with YTB 2 x 10   Supine ER and horizontal abd YTB 2 x 10     Assessment   Table slides + lift today added with moderate amount of challenge for nicolas. She continues to push her ROM and strength with each visit and feels like she gains a little more each day. Pt did need some cueing for sidelying shoulder flexion to keep arm closer to ear and not let compensation of UT take over.  Pt continues to benefit from skilled PT to progress towards her goals.     Plan   Continue current POC, progress as tolerated

## 2025-02-17 ENCOUNTER — TREATMENT (OUTPATIENT)
Dept: PHYSICAL THERAPY | Facility: CLINIC | Age: 65
End: 2025-02-17
Payer: COMMERCIAL

## 2025-02-17 DIAGNOSIS — M25.512 ACUTE PAIN OF LEFT SHOULDER: Primary | ICD-10-CM

## 2025-02-17 PROCEDURE — 97110 THERAPEUTIC EXERCISES: CPT | Mod: GP

## 2025-02-17 PROCEDURE — 97140 MANUAL THERAPY 1/> REGIONS: CPT | Mod: GP

## 2025-02-17 NOTE — PROGRESS NOTES
Physical Therapy Treatment    Patient Name: Eli Matthew  MRN: [id number]  Time Calculation  Start Time: 0745  Stop Time: 0825  Time Calculation (min): 40 min     PT Therapeutic Procedures Time Entry  Manual Therapy Time Entry: 10  Therapeutic Exercise Time Entry: 30                   Current Problem  1. Acute pain of left shoulder          INSURANCE:  Visit number: 8/11  ANTHEM 30V ( 28 REMAIN PT ) COPAY 0 DED 5000(4850),COVERAGE 80 OOP 0 NO AUTH REQ,RE     Subjective   General  Pt presents to the clinic with report of some increased pain over the weekend that she attributes to possibly overdoing it. She describes her pain as burning but it has since gotten better since then.    Precautions  L SLAP Repair, biceps tenodesis, SAD and RCR- Robles   Can perform AAROM/PROM, isometrics, cleared for bicep flexion with no weight     Pain  2/10    Objective     Treatments:  UBE 2/2 fwd/bwd       Manual: PROM GHJ to pt tolerance       Flexion table slides + lift 2 x 10   Scaption table slides + lift 2 x 10  ER cane 10s hold, 2 x 10   SL 3 way jobes 2 x 10   SL ER 3 x 10   Shoulder protraction 3 x 10   Supine horizontal abd red TB 2x 10  ER doorway stretch 2 x 30s      HEP/Education:   Access Code: [email or web address]  URL: [email or web address]  Date: 02/17/2025  Prepared by: Delaney Yang    Exercises  - Standing Shoulder External Rotation Stretch in Doorway  - 1 x daily - 4 x weekly - 2 sets - 30 hold    Assessment   Advised pt space out HEP to every other day in order to give arm a break. Today is pt's last visit before to goes to [place] for a few weeks. Pt instructed to continue to push ER with doorway stretch and holding cane ER for longer. Pt needed cueing to stop from thoracic spine rolling backwards to compensate for more ROM during jobes.     Plan   Continue current POC, progress as tolerated

## 2025-03-03 ENCOUNTER — APPOINTMENT (OUTPATIENT)
Dept: PHYSICAL THERAPY | Facility: CLINIC | Age: 65
End: 2025-03-03
Payer: COMMERCIAL

## 2025-03-05 NOTE — PROGRESS NOTES
Physical Therapy Treatment    Patient Name: Eli Matthew  MRN: [id number]  Time Calculation  Start Time: 1017  Stop Time: 1058  Time Calculation (min): 41 min     PT Therapeutic Procedures Time Entry  Manual Therapy Time Entry: 10  Therapeutic Exercise Time Entry: 31                   Current Problem  1. Acute pain of left shoulder            INSURANCE:  Visit number: 8/11  ANTHEM 30V ( 28 REMAIN PT ) COPAY 0 DED 5000(4850),COVERAGE 80 OOP 0 NO AUTH REQ,RE     Subjective   General  Pt reports that she has backed off on her HEP and this has seemed to help. She has minimal soreness in her shoulder. And she feels like her ROM has improved.     Precautions  L SLAP Repair, biceps tenodesis, SAD and RCR- Robles  12/6  Can perform AAROM/PROM, isometrics, cleared for bicep flexion with no weight       Plan:  Discussed transition out of sling.  Discussed physical therapy protocol and activity restrictions.  We highly encouraged dedicated physical therapy for adhesive capsulitis to put emphasis on passive range of motion.  We do feel that active range of motion is appropriate however given her lack of passive range of motion she may struggle with this.  Recommended transitioning physical therapy protocol to adhesive capsulitis protocol, with continuing to pain mind lifting restrictions per RCR protocol.  Follow-up 6-8  weeks. -Han Ortez PA-C note 1/22/25  Pain  2/10    Objective   AROM Shoulder Flexion: L- 135 degrees   AROM Shoulder Abduction L- 138 degrees  Treatments:  UBE 2.5/2.5 fwd/bwd       Manual 10 minutes  Posterior GH mobs, Inferior mobs at 120 degrees flexion, grade II    Sidelying ER plus abduction 2x8-10 tactile cueing  Supine D2 Flexion 2x10  Standing repeated shoulder extension to tolerance 2x10-12  Flexion Wall Slide Scapular assisted 2x10-12  Scaption wall slide 2x10-12  Abduction wall slide 2x10      HEP/Education:   Access Code: [email or web address]  URL: [email or web address]  Date:  03/06/2025  Prepared by: Ferddy Gomez    Exercises  - Standing shoulder flexion wall slides  - 1 x daily - 4 x weekly - 2 sets - 10 reps    *Discussed choosing 7-8 of 14 accumulated HEP items to engage in each day, as along as shoulder soreness does not increase  Assessment   Patient tolerated treatment well. Patient is displaying improvement in left shoulder flexion and abduction AROM. Patient continues to display limitations with left shoulder ER AROM and PROM.  Patient also is displaying limitations with AROM left shoulder extension. Patient tolerated inclusion of left GH joint mobilizations today. Increased emphasis on combined motion AROM training today and patient tolerated well. Patient did display tendency for right shoulder shrugging with right flexion wall slide, but with cueing and education, patient was able to minimize this. Added this to HEP to continue to improved right shoulder elevation ROM. Patient appeared to understand all education provided. Will continue to benefit from skilled intervention to address the above mentioned impairments and limitations. No adverse reactions were observed or reported from patient at the conclusion of today's session.        Plan   Progress PROM and AROM of right shoulder as able

## 2025-03-06 ENCOUNTER — TREATMENT (OUTPATIENT)
Dept: PHYSICAL THERAPY | Facility: CLINIC | Age: 65
End: 2025-03-06
Payer: COMMERCIAL

## 2025-03-06 DIAGNOSIS — M25.512 ACUTE PAIN OF LEFT SHOULDER: Primary | ICD-10-CM

## 2025-03-06 PROCEDURE — 97110 THERAPEUTIC EXERCISES: CPT | Mod: GP | Performed by: PHYSICAL THERAPIST

## 2025-03-06 PROCEDURE — 97140 MANUAL THERAPY 1/> REGIONS: CPT | Mod: GP | Performed by: PHYSICAL THERAPIST

## 2025-03-10 ENCOUNTER — OFFICE VISIT (OUTPATIENT)
Dept: ORTHOPEDIC SURGERY | Facility: CLINIC | Age: 65
End: 2025-03-10
Payer: COMMERCIAL

## 2025-03-10 ENCOUNTER — TREATMENT (OUTPATIENT)
Dept: PHYSICAL THERAPY | Facility: CLINIC | Age: 65
End: 2025-03-10
Payer: COMMERCIAL

## 2025-03-10 DIAGNOSIS — G89.18 POST-OP PAIN: Primary | ICD-10-CM

## 2025-03-10 DIAGNOSIS — M25.512 ACUTE PAIN OF LEFT SHOULDER: Primary | ICD-10-CM

## 2025-03-10 PROCEDURE — 1036F TOBACCO NON-USER: CPT | Performed by: STUDENT IN AN ORGANIZED HEALTH CARE EDUCATION/TRAINING PROGRAM

## 2025-03-10 PROCEDURE — 99213 OFFICE O/P EST LOW 20 MIN: CPT | Performed by: STUDENT IN AN ORGANIZED HEALTH CARE EDUCATION/TRAINING PROGRAM

## 2025-03-10 PROCEDURE — 97110 THERAPEUTIC EXERCISES: CPT | Mod: GP

## 2025-03-10 PROCEDURE — 97140 MANUAL THERAPY 1/> REGIONS: CPT | Mod: GP

## 2025-03-10 NOTE — PROGRESS NOTES
Physical Therapy Treatment    Patient Name: Eli Matthew  MRN: [id number]  Time Calculation  Start Time: 1115  Stop Time: 1155  Time Calculation (min): 40 min     PT Therapeutic Procedures Time Entry  Manual Therapy Time Entry: 10  Therapeutic Exercise Time Entry: 30          Current Problem  1. Acute pain of left shoulder        INSURANCE:  Visit number: 9/11  ANTHEM 30V ( 28 REMAIN PT ) COPAY 0 DED 5000(4850),COVERAGE 80 OOP 0 NO AUTH REQ,RE     Subjective   General  No significant changes reported.     Precautions  L SLAP Repair, biceps tenodesis, SAD and RCR- Robles  12/6  P/A/AROM with resistance (1-2#, progress according to protocol)      Pain  0/10    Objective     Treatments:  UBE 2.5/2.5 fwd/bwd     Manual: PROM to GHJ to pt tolerance     Cane CP 3# 3 x 10   Cane flexion 3# 2 x 10 supine, standing 2#   ER doorway stretch 2 x 30s   IR with cane 2 x 10 2#   Wall slides with lift 2 x 10   Prone I (1#), T,Y (no weight) 2 x 10   SL ER 1# 2 x 10     Access Code: [email or web address]  URL: [email or web address]  Date: 03/10/2025  Prepared by: Delaney Yang    Exercises  - Supine Shoulder External Rotation with Dowel  - 1 x daily - 4 x weekly - 2 sets - 10 reps  - Sidelying Shoulder External Rotation  - 1 x daily - 4 x weekly - 2 sets - 10 reps  - Sidelying Shoulder Abduction Palm Forward  - 1 x daily - 4 x weekly - 2 sets - 10 reps  - Sidelying Shoulder Flexion 15 Degrees  - 1 x daily - 4 x weekly - 2 sets - 10 reps  - Sidelying Shoulder Horizontal Abduction  - 1 x daily - 4 x weekly - 2 sets - 10 reps  - Standing Bilateral Shoulder Scaption Wall Slide  - 1 x daily - 4 x weekly - 2 sets - 10 reps  - Prone Single Arm Shoulder Y  - 1 x daily - 4 x weekly - 2 sets - 10 reps  - Prone Shoulder Extension - Single Arm  - 1 x daily - 4 x weekly - 2 sets - 10 reps  - Prone Shoulder Horizontal Abduction  - 1 x daily - 4 x weekly - 2 sets - 10 reps  - Standing Shoulder External Rotation Stretch in Doorway  - 1  x daily - 4 x weekly - 2 sets - 30 hold      Assessment   Pt continues to progress in overall ROM and strength for L shoulder. Light resistance was allowed to be incorporated today according to ortho's last note. Eli was able to tolerate adding 1# to each exercise besides prone T and Y only d/t lack of ROM with these. She had moderate amount of mm fatigue with each. HEP updated with appropriate weight. Pt continues to benefit from skilled PT in order to keep progressing towards their goals.     Plan   Continue current POC, progress as tolerated

## 2025-03-10 NOTE — PROGRESS NOTES
History of Present Illness:  Patient returns today after shoulder arthroscopy doing well.  The patient endorses good relief of pre-operative symptoms and increasing activity level.  She states that her capsulitis is resolving, and she is improving with physical therapy every day.    Physical Examination:  Well healed incisions  Forward flexion / External rotation similar to contralateral side however lacking approximately 15 to 20 degrees of full forward flexion as well as about 20 degrees of internal rotation.   No significant deficits in rotator cuff strength testing  Neurovascular exam normal distally    Assessment:  Patient status post left shoulder arthroscopy with RCR, Biceps tenodesis, and SAD.   Adhesive Capsulitis is resolving    Plan:  Discussed home exercise program and activities to avoid.  Discussed return to activities.  Reviewed NSAIDS for occasional pain, discussed the risks and benefits.  Encouraged that she increase her physical therapy weight training at this point we feel it is okay to progress to heavier weights per protocol.  Continue to work on passive range of motion to forward flexion and internal rotation given previous adhesive capsulitis.  Follow-up: 2 months    Han Ortez PA-C

## 2025-03-12 ENCOUNTER — OFFICE VISIT (OUTPATIENT)
Dept: PRIMARY CARE | Facility: CLINIC | Age: 65
End: 2025-03-12
Payer: COMMERCIAL

## 2025-03-12 VITALS
WEIGHT: 125.6 LBS | DIASTOLIC BLOOD PRESSURE: 80 MMHG | HEART RATE: 70 BPM | SYSTOLIC BLOOD PRESSURE: 120 MMHG | TEMPERATURE: 97.3 F | RESPIRATION RATE: 18 BRPM | OXYGEN SATURATION: 98 % | HEIGHT: 62 IN | BODY MASS INDEX: 23.11 KG/M2

## 2025-03-12 DIAGNOSIS — J01.00 ACUTE NON-RECURRENT MAXILLARY SINUSITIS: Primary | ICD-10-CM

## 2025-03-12 PROCEDURE — 3008F BODY MASS INDEX DOCD: CPT | Performed by: NURSE PRACTITIONER

## 2025-03-12 PROCEDURE — 99214 OFFICE O/P EST MOD 30 MIN: CPT | Performed by: NURSE PRACTITIONER

## 2025-03-12 PROCEDURE — 1036F TOBACCO NON-USER: CPT | Performed by: NURSE PRACTITIONER

## 2025-03-12 RX ORDER — AMOXICILLIN AND CLAVULANATE POTASSIUM 875; 125 MG/1; MG/1
1 TABLET, FILM COATED ORAL 2 TIMES DAILY
Qty: 14 TABLET | Refills: 0 | Status: SHIPPED | OUTPATIENT
Start: 2025-03-12 | End: 2025-03-19

## 2025-03-12 ASSESSMENT — ENCOUNTER SYMPTOMS
SINUS PRESSURE: 1
COUGH: 0
SINUS PAIN: 1
SORE THROAT: 0
HEADACHES: 1

## 2025-03-12 NOTE — PROGRESS NOTES
"Subjective   Patient ID: Eli Matthew is a 64 y.o. female who presents for Sick Visit (Started with sore throat and progressed with cough. Congestion for 12 days /(yellow-green) and Headache. Pt did Covid test and was negative. ).    Having headache, sinus pain and congestion that has not subsided. Otherwise feels fine and the URI she had prior is resolved         Review of Systems   HENT:  Positive for congestion, sinus pressure and sinus pain. Negative for ear pain and sore throat.    Respiratory:  Negative for cough.    Neurological:  Positive for headaches.       Objective   /80   Pulse 70   Temp 36.3 °C (97.3 °F)   Resp 18   Ht 1.575 m (5' 2\")   Wt 57 kg (125 lb 9.6 oz)   SpO2 98%   BMI 22.97 kg/m²     Physical Exam  Vitals and nursing note reviewed.   Constitutional:       General: She is not in acute distress.  HENT:      Head: Normocephalic and atraumatic.      Nose: Congestion present.      Right Sinus: Maxillary sinus tenderness present.      Left Sinus: Maxillary sinus tenderness present.   Eyes:      Pupils: Pupils are equal, round, and reactive to light.   Cardiovascular:      Rate and Rhythm: Normal rate and regular rhythm.   Skin:     General: Skin is warm and dry.   Neurological:      Mental Status: She is alert and oriented to person, place, and time.   Psychiatric:         Mood and Affect: Mood normal.         Assessment/Plan   Problem List Items Addressed This Visit    None  Visit Diagnoses         Codes    Acute non-recurrent maxillary sinusitis    -  Primary J01.00    Relevant Medications    amoxicillin-pot clavulanate (Augmentin) 875-125 mg tablet               "

## 2025-03-17 ENCOUNTER — TREATMENT (OUTPATIENT)
Dept: PHYSICAL THERAPY | Facility: CLINIC | Age: 65
End: 2025-03-17
Payer: COMMERCIAL

## 2025-03-17 DIAGNOSIS — M25.512 ACUTE PAIN OF LEFT SHOULDER: Primary | ICD-10-CM

## 2025-03-17 PROCEDURE — 97110 THERAPEUTIC EXERCISES: CPT | Mod: GP

## 2025-03-17 PROCEDURE — 97140 MANUAL THERAPY 1/> REGIONS: CPT | Mod: GP

## 2025-03-17 NOTE — PROGRESS NOTES
Physical Therapy Re-Evaluation    Patient Name: Eli Matthew  MRN: [id number]  Time Calculation  Start Time: 1015  Stop Time: 1100  Time Calculation (min): 45 min     PT Therapeutic Procedures Time Entry  Manual Therapy Time Entry: 10  Therapeutic Exercise Time Entry: 35                   Current Problem  1. Acute pain of left shoulder          INSURANCE:  Visit number: 10/11  ANTHEM 30V ( 28 REMAIN PT ) COPAY 0 DED 5000(5320),COVERAGE 80 OOP 0 NO AUTH REQ,RE       Subjective   General:  Pt is here for a re-evaluation of her L shoulder following a  left shoulder arthroscopy with SLAP repair, biceps tenodesis, subacromial decompression, rotator cuff repair on 12/6/24 with Dr. Robles. Next f/u with ortho is 6/3/25    Precautions:  L SLAP Repair, biceps tenodesis, SAD and RCR- Travis  12/6  P/A/AROM with resistance (1-2#, progress according to protocol)     Pain:    Reviewed medical screening form with pt and medical screening assessed    Imaging:     Objective   Shoulder Musculoskeletal Exam    Inspection    Left      Incision: well-healed      Incisional drainage: none    Palpation    Left      Tenderness: none    Range of Motion    Right      Active forward elevation: 180.       Internal rotation: mid thoracic.     Left      Active ROM: no pain.       Passive ROM: no pain.       Forward elevation: 134.       Passive forward elevation: 165.       Left shoulder active abduction: 140.       Passive abduction: 140.       External rotation 90 degrees: to behind ear.       Passive external rotation in abduction: 40.       Passive internal rotation in abduction: 90.       Active internal rotation behind the back: to pocket.     Strength    Right      Right shoulder strength is normal.     Left      External rotation: 4/5.       Internal rotation: 5/5.       Abduction: 4+/5.       Biceps: 5/5.       Triceps: 5/5.     Strength additional comments: L deltoid: 5-/5         Outcome Measures:  Other Measures  Disability of Arm  Shoulder Hand (DASH): 20     Treatment:   Includes measures for re-eval     Manual: PROM GHJ to pt tolerance, grade I-II posteroinferior mobilizations      EDUCATION/HEP:  Access Code: updated [email or web address]  URL: [email or web address]  Date: 03/17/2025  Prepared by: Delaney Yang    Exercises  - Supine Shoulder External Rotation with Dowel  - 1 x daily - 4 x weekly - 2 sets - 10 reps  - Supine Single Arm Shoulder Protraction  - 1 x daily - 4 x weekly - 2 sets - 10 reps  - Sidelying Shoulder External Rotation  - 1 x daily - 4 x weekly - 2 sets - 10 reps  - Sidelying Shoulder Abduction Palm Forward  - 1 x daily - 4 x weekly - 2 sets - 10 reps  - Sidelying Shoulder Flexion 15 Degrees  - 1 x daily - 4 x weekly - 2 sets - 10 reps  - Sidelying Shoulder Horizontal Abduction  - 1 x daily - 4 x weekly - 2 sets - 10 reps  - Standing Bilateral Shoulder Scaption Wall Slide  - 1 x daily - 4 x weekly - 2 sets - 10 reps  - Prone Single Arm Shoulder Y  - 1 x daily - 4 x weekly - 2 sets - 10 reps  - Prone Shoulder Extension - Single Arm  - 1 x daily - 4 x weekly - 2 sets - 10 reps  - Prone Shoulder Horizontal Abduction  - 1 x daily - 4 x weekly - 2 sets - 10 reps  - Standing Shoulder External Rotation Stretch in Doorway  - 1 x daily - 4 x weekly - 2 sets - 30 hold  - Standing Shoulder Internal Rotation Stretch with Towel  - 1 x daily - 4 x weekly - 2 sets - 30 hold    Assessment:  Pt is here for a re-evaluation of her L shoulder following a L shoulder following a  left shoulder arthroscopy with SLAP repair, biceps tenodesis, subacromial decompression, rotator cuff repair on 12/6/24 with Dr. Robles. Pt regaining functional strength and ROM as expected. She is mostly limited with ER and flexion. Continues to push herself during sessions in order to increase her strength and requires no cueing for scapular activation. Pt continues to benefit from skilled PT in order to keep progressing towards their goals.        Clinical Presentation: Stable    Level of Complexity: Low    Goals:  Pt will report at least 75% improvement in L shoulder pain during everyday activities. MET   Pt will demo >/= 4+/5 strength of L shoulder musculature without pain. PROGRESSING  Pt will demo full and symmetrical AROM of cervical spine without pain. MET  Pt will demo symmetrical A/PROM of L shoulder to  without pain. PROGRESSING  Pt will improve Quick DASH score by at least 20% (MCID) to indicate a significant improvement in overall function. MET  Pt will demo independence and report compliance with HEP. MET    Plan  Additional 1x/week for 6 visits     Skilled therapeutic intervention to address the above mentioned physical and functional impairments and limitations including, but not limited to: patient education, therapeutic exercise, therapeutic activity, manual therapy, body mechanics training, dry needling, blood flow restriction training, instrumented soft tissue mobilization, manual soft tissue mobilization, gait retraining, biofeedback, cryotherapy, electrical stimulation, home program development, hot pack, taping, neuromuscular re-education, self-care/home management, and vasopneumatic compression.

## 2025-03-20 NOTE — PROGRESS NOTES
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 Well developed, well nourished.   HEAD AND FACE: Head and face: Normal.   EYES: Normal external exam - nonicteric sclera, extraocular movements intact (EOMI) and no ptosis.   EARS, NOSE, MOUTH, AND THROAT: External inspection of ears and nose: Normal.   BREASTS: No masses tenderness nipple discharge or axillary nodes  ABDOMEN: Soft, nontender  GENITOURINARY: External genitalia: Normal. No inguinal lymphadenopathy. Bartholin's Urethral and Skenes Glands: Normal. Urethra: Normal. Bladder: Normal on palpation. Vagina: Normal. Cervix: Normal. Uterus: Normal. Right Adnexa/parametria: Normal. Left Adnexa/parametria: Normal. Inspection of Perianal Area: Normal.   MUSCULOSKELETAL: No joint swelling seen, normal movements of all extremities.   SKIN: Normal skin color and pigmentation, normal skin turgor, and no rash.   NEUROLOGIC: Non-focal. Grossly intact.        PSYCHIATRIC: Alert and oriented x 3. Affect normal to patient baseline. Mood: Appropriate.     Assessment/plan:   Unremarkable well woman exam  Restart Vagifem for vaginal atrophy

## 2025-03-24 ENCOUNTER — TREATMENT (OUTPATIENT)
Dept: PHYSICAL THERAPY | Facility: CLINIC | Age: 65
End: 2025-03-24
Payer: COMMERCIAL

## 2025-03-24 DIAGNOSIS — M25.512 ACUTE PAIN OF LEFT SHOULDER: Primary | ICD-10-CM

## 2025-03-24 PROCEDURE — 97110 THERAPEUTIC EXERCISES: CPT | Mod: GP,CQ

## 2025-03-24 PROCEDURE — 97140 MANUAL THERAPY 1/> REGIONS: CPT | Mod: GP,CQ

## 2025-03-24 ASSESSMENT — PAIN SCALES - GENERAL: PAINLEVEL_OUTOF10: 0 - NO PAIN

## 2025-03-24 ASSESSMENT — PAIN - FUNCTIONAL ASSESSMENT: PAIN_FUNCTIONAL_ASSESSMENT: 0-10

## 2025-03-24 NOTE — PROGRESS NOTES
"Physical Therapy Treatment    Patient Name: Eli Matthew  MRN: [id number]  Today's Date: 3/24/2025  Time Calculation  Start Time: 1100  Stop Time: 1142  Time Calculation (min): 42 min     PT Therapeutic Procedures Time Entry  Manual Therapy Time Entry: 8  Therapeutic Exercise Time Entry: 34                 Insurance:   Visit number: 11/16  ANTHEM 30V ( 28 REMAIN PT ) COPAY 0 DED 5000(4850),COVERAGE 80 OOP 0 NO AUTH REQ, RE     s/p left shoulder arthroscopy with SLAP repair, biceps tenodesis, subacromial decompression, rotator cuff repair on 12/6/24 with Dr. Robles       Assessment:  Pt demo's overall good ability to complete today's program. Some discomfort and muscle shaking at end ranges/end reps w/ some ex's. Most difficulty and limited mobility observed w/ Prone I, T, Y ex. Pt presents w/ good PROM L shoulder performed at end of session today. She is making nice progress at this time and will cont to benefit from skilled PT to address her deficits and improve her overall functional ability.     Plan:   Continue current POC, progress as tolerated.     Current Problem  1. Acute pain of left shoulder            Subjective   General   Pt states she has been doing more at home w/ less difficulties. \"I even raked the yard yesterday for about 3 hours (not continuous, did some weeding of beds, clipping of flowers back)   Precautions       Pain  Pain Assessment: (P) 0-10  0-10 (Numeric) Pain Score: (P) 0 - No pain    Objective       Treatments:  Therapeutic Exercise (05786):   UBE 3'/3' fwd/bwd    Cane CP 3# 3 x 10   Cane flexion 3# 2 x 10 supine, standing 2#   ER doorway stretch 2 x 30s   IR with cane 3 x 10 2#   Wall slides with lift 2 x 10   Prone I (1#), T,Y (no weight) 2 x 10   SL ER 1# 2 x 10      Manual (31162):  PROM to GHJ to pt tolerance   EDUCATION:         "

## 2025-03-25 ENCOUNTER — APPOINTMENT (OUTPATIENT)
Dept: OBSTETRICS AND GYNECOLOGY | Facility: CLINIC | Age: 65
End: 2025-03-25
Payer: COMMERCIAL

## 2025-03-25 VITALS
BODY MASS INDEX: 23.74 KG/M2 | SYSTOLIC BLOOD PRESSURE: 128 MMHG | HEIGHT: 62 IN | DIASTOLIC BLOOD PRESSURE: 64 MMHG | WEIGHT: 129 LBS

## 2025-03-25 DIAGNOSIS — N95.2 VAGINAL ATROPHY: ICD-10-CM

## 2025-03-25 DIAGNOSIS — Z12.31 BREAST CANCER SCREENING BY MAMMOGRAM: ICD-10-CM

## 2025-03-25 DIAGNOSIS — Z01.419 WELL WOMAN EXAM WITH ROUTINE GYNECOLOGICAL EXAM: Primary | ICD-10-CM

## 2025-03-25 PROCEDURE — 3008F BODY MASS INDEX DOCD: CPT | Performed by: OBSTETRICS & GYNECOLOGY

## 2025-03-25 PROCEDURE — 99396 PREV VISIT EST AGE 40-64: CPT | Performed by: OBSTETRICS & GYNECOLOGY

## 2025-03-25 PROCEDURE — 1036F TOBACCO NON-USER: CPT | Performed by: OBSTETRICS & GYNECOLOGY

## 2025-03-25 ASSESSMENT — PAIN SCALES - GENERAL: PAINLEVEL_OUTOF10: 0-NO PAIN

## 2025-03-26 ENCOUNTER — TELEPHONE (OUTPATIENT)
Dept: OBSTETRICS AND GYNECOLOGY | Facility: CLINIC | Age: 65
End: 2025-03-26
Payer: COMMERCIAL

## 2025-03-26 RX ORDER — ESTRADIOL 10 UG/1
TABLET, FILM COATED VAGINAL
Qty: 24 TABLET | Refills: 6 | Status: SHIPPED | OUTPATIENT
Start: 2025-03-27

## 2025-03-26 NOTE — TELEPHONE ENCOUNTER
Attempted to call pt back to further discuss her message.  Got her voice mail.  Message left to call office.  Do let pt know that dr ingram did not sign off on the med as yet-office sent provider a reminder to do so now.

## 2025-03-26 NOTE — TELEPHONE ENCOUNTER
Pt returned call to office.   Discussed vagifem script.   Aware dr ingram did sign off on it today.  Understanding voiced.

## 2025-03-31 ENCOUNTER — TREATMENT (OUTPATIENT)
Dept: PHYSICAL THERAPY | Facility: CLINIC | Age: 65
End: 2025-03-31
Payer: COMMERCIAL

## 2025-03-31 DIAGNOSIS — M25.512 ACUTE PAIN OF LEFT SHOULDER: Primary | ICD-10-CM

## 2025-03-31 PROCEDURE — 97140 MANUAL THERAPY 1/> REGIONS: CPT | Mod: GP

## 2025-03-31 PROCEDURE — 97110 THERAPEUTIC EXERCISES: CPT | Mod: GP

## 2025-03-31 NOTE — PROGRESS NOTES
Physical Therapy Treatment    Patient Name: Eli Matthew  MRN: [id number]  Time Calculation  Start Time: 1100  Stop Time: 1140  Time Calculation (min): 40 min     PT Therapeutic Procedures Time Entry  Manual Therapy Time Entry: 10  Therapeutic Exercise Time Entry: 30                   Current Problem  1. Acute pain of left shoulder          Insurance:   Visit number: 12/16  ANTHEM 30V ( 28 REMAIN PT ) COPAY 0 DED 5000(4850),COVERAGE 80 OOP 0 NO AUTH REQ, RE   Subjective   General  Pt presents with no significant changes since last visit. Noticed that it is hard to roll up windows in old car and reaching behind back.    Precautions  L SLAP Repair, biceps tenodesis, SAD and RCR- Robles  12/6  P/A/AROM with resistance (1-2#, progress according to protocol)     Pain  0/10    Objective     Treatments:  UBE 2/2 fwd/bwd     Manual: PROM GHJ to pt tolerance, grade I-II posteroinferior mobilizations     Access Code: updated [email or web address]  URL: [email or web address]  Date: 03/31/2025  Prepared by: Delaney Yang    Exercises  - Supine Shoulder External Rotation with Dowel  - 1 x daily - 4 x weekly - 2 sets - 10 reps  - Supine Single Arm Shoulder Protraction  - 1 x daily - 4 x weekly - 2 sets - 10 reps  - Sidelying Shoulder External Rotation  - 1 x daily - 4 x weekly - 2 sets - 10 reps  - Sidelying Shoulder Abduction Palm Forward  - 1 x daily - 4 x weekly - 2 sets - 10 reps  - Sidelying Shoulder Flexion 15 Degrees  - 1 x daily - 4 x weekly - 2 sets - 10 reps  - Sidelying Shoulder Horizontal Abduction  - 1 x daily - 4 x weekly - 2 sets - 10 reps  - Standing Bilateral Shoulder Scaption Wall Slide  - 1 x daily - 4 x weekly - 2 sets - 10 reps  - Prone Single Arm Shoulder Y  - 1 x daily - 4 x weekly - 2 sets - 10 reps  - Prone Shoulder Extension - Single Arm  - 1 x daily - 4 x weekly - 2 sets - 10 reps  - Prone Shoulder Horizontal Abduction  - 1 x daily - 4 x weekly - 2 sets - 10 reps  - Standing Shoulder  External Rotation Stretch in Doorway  - 1 x daily - 4 x weekly - 2 sets - 30 hold  - Standing Shoulder Internal Rotation Stretch with Towel  - 1 x daily - 4 x weekly - 2 sets - 30 hold  - Sleeper Stretch  - 1 x daily - 4 x weekly - 2 sets - 30 hold  - Standing Elbow Extension with Anchored Resistance  - 1 x daily - 4 x weekly - 2 sets - 10 reps  - Shoulder External Rotation with Anchored Resistance  - 1 x daily - 4 x weekly - 2 sets - 10 reps  - Shoulder extension with resistance - Neutral  - 1 x daily - 4 x weekly - 2 sets - 10 reps  - Standing Shoulder Row with Anchored Resistance  - 1 x daily - 4 x weekly - 2 sets - 10 reps  - Seated Single Arm Elbow Flexion with Resistance  - 1 x daily - 4 x weekly - 2 sets - 10 reps  - Face Pulls  - 1 x daily - 4 x weekly - 2 sets - 10 reps  - Standing Single Arm Shoulder External Rotation in Abduction with Anchored Resistance  - 1 x daily - 4 x weekly - 2 sets - 10 reps      Assessment   Pt able to tolerate advanced resisted exercises today with red TB. Had some difficulty with overhead movement and cueing needed to keep elbow by her side during ER. Stated that SL exercises were getting too easy for her so HEP was performed from prone lying exercises on today. Pt continues to benefit from skilled PT in order to keep progressing towards their goals. '      Plan   Continue current POC, progress as tolerated

## 2025-04-07 ENCOUNTER — TREATMENT (OUTPATIENT)
Dept: PHYSICAL THERAPY | Facility: CLINIC | Age: 65
End: 2025-04-07
Payer: COMMERCIAL

## 2025-04-07 DIAGNOSIS — M25.512 ACUTE PAIN OF LEFT SHOULDER: Primary | ICD-10-CM

## 2025-04-07 PROCEDURE — 97110 THERAPEUTIC EXERCISES: CPT | Mod: GP

## 2025-04-07 PROCEDURE — 97140 MANUAL THERAPY 1/> REGIONS: CPT | Mod: GP

## 2025-04-07 NOTE — PROGRESS NOTES
Physical Therapy Treatment    Patient Name: Eli Matthew  MRN: [id number]  Time Calculation  Start Time: 1115  Stop Time: 1159  Time Calculation (min): 44 min     PT Therapeutic Procedures Time Entry  Manual Therapy Time Entry: 15  Therapeutic Exercise Time Entry: 25                   Current Problem  1. Acute pain of left shoulder          Insurance:   Visit number: 13/16  ANTHEM 30V ( 28 REMAIN PT ) COPAY 0 DED 5000(4850),COVERAGE 80 OOP 0 NO AUTH REQ,     Subjective   General  Pt states that she has no significant changes. Continues to work on HEP and slowly progressing herself with heavier functional activities with her horse.     Precautions  L SLAP Repair, biceps tenodesis, SAD and RCR- Robles  12/6  P/A/AROM with resistance (1-2#, progress according to protocol)     Pain  1/10    Objective     Treatments:  UBE 2/2 fwd/bwd      Manual: PROM GHJ to pt tolerance, grade I-II posteroinferior mobilizations     Cane flexion 4# 2 x 10   Protraction 2 x 10 4#   ER doorway stretch 2 x 30s   Wall scaption with lift and YTB 3 x 8   Clean and press UE 4# cane 3 x 8  Lat pulldown (B) 5# 3 x 8   Chops 3 x 10 green TB   Functional deadlift 5# 3 x 8   Reverse chops green TB 3 x10  Shoulder blade 2 mins       Assessment   Pt able to challenge endurance and strength with more functional exercises today. Reached out to ortho to see if we can increase weight at this point. Pt did state moderate mm fatigue after exercises. Pt continues to benefit from skilled PT in order to keep progressing towards their goals.       Plan   Continue current POC, progress as tolerated

## 2025-04-14 ENCOUNTER — TREATMENT (OUTPATIENT)
Dept: PHYSICAL THERAPY | Facility: CLINIC | Age: 65
End: 2025-04-14
Payer: COMMERCIAL

## 2025-04-14 ENCOUNTER — OFFICE VISIT (OUTPATIENT)
Dept: URGENT CARE | Age: 65
End: 2025-04-14
Payer: COMMERCIAL

## 2025-04-14 VITALS
HEIGHT: 63 IN | DIASTOLIC BLOOD PRESSURE: 66 MMHG | WEIGHT: 121 LBS | SYSTOLIC BLOOD PRESSURE: 119 MMHG | HEART RATE: 51 BPM | BODY MASS INDEX: 21.44 KG/M2 | RESPIRATION RATE: 16 BRPM | OXYGEN SATURATION: 98 % | TEMPERATURE: 98 F

## 2025-04-14 DIAGNOSIS — H57.89 IRRITATION OF RIGHT EYE: Primary | ICD-10-CM

## 2025-04-14 DIAGNOSIS — M25.512 ACUTE PAIN OF LEFT SHOULDER: Primary | ICD-10-CM

## 2025-04-14 PROCEDURE — 97140 MANUAL THERAPY 1/> REGIONS: CPT | Mod: GP

## 2025-04-14 PROCEDURE — 3008F BODY MASS INDEX DOCD: CPT | Performed by: PHYSICIAN ASSISTANT

## 2025-04-14 PROCEDURE — 99203 OFFICE O/P NEW LOW 30 MIN: CPT | Performed by: PHYSICIAN ASSISTANT

## 2025-04-14 PROCEDURE — 97110 THERAPEUTIC EXERCISES: CPT | Mod: GP

## 2025-04-14 PROCEDURE — 1036F TOBACCO NON-USER: CPT | Performed by: PHYSICIAN ASSISTANT

## 2025-04-14 RX ORDER — DICLOFENAC SODIUM 1 MG/ML
1 SOLUTION/ DROPS OPHTHALMIC 4 TIMES DAILY PRN
Qty: 2.5 ML | Refills: 0 | Status: SHIPPED | OUTPATIENT
Start: 2025-04-14 | End: 2025-04-17

## 2025-04-14 ASSESSMENT — PATIENT HEALTH QUESTIONNAIRE - PHQ9
SUM OF ALL RESPONSES TO PHQ9 QUESTIONS 1 AND 2: 0
2. FEELING DOWN, DEPRESSED OR HOPELESS: NOT AT ALL
1. LITTLE INTEREST OR PLEASURE IN DOING THINGS: NOT AT ALL

## 2025-04-14 ASSESSMENT — VISUAL ACUITY: OU: 1

## 2025-04-14 NOTE — PATIENT INSTRUCTIONS
May use hydrating drops otc for comfort  Avoid scratching or rubbing the eye     Monitor for the following and if these occur follow up in the ED.   You notice a change in your eyesight.   Your eye pain gets worse.   You still have eye pain or are bothered by bright lights after a few days.   You have signs of infection - These include:   Fever of 100.4°F (38°C) or higher and chills   Swelling, redness, warmth, or pain around the eye   Drainage from the eye

## 2025-04-14 NOTE — PROGRESS NOTES
Physical Therapy Treatment    Patient Name: Eli Matthew  MRN: [id number]  Time Calculation  Start Time: 1045  Stop Time: 1123  Time Calculation (min): 38 min     PT Therapeutic Procedures Time Entry  Manual Therapy Time Entry: 10  Therapeutic Exercise Time Entry: 28                   Insurance:   Visit number: 14/16  ANTHEM 30V ( 28 REMAIN PT ) COPAY 0 DED 5000(0103),COVERAGE 80 OOP 0 NO AUTH REQ,     Current Problem  1. Acute pain of left shoulder            Subjective   General  Pt states easier time performing ADLs and continued progressing in ROM.     Precautions  5-10 #     Pain  0/10    Objective     Treatments:  BRF 50% occlusion 30/15/15/15  -bicep curl 3#  -tricep ext 12#   -shoulder ext blue TB   -ER     Manual: PROM to GHJ to pt tolerance - 10 mins     Assessment   Pt able to tolerate BFR with no adverse reactions. Stated moderate mm fatigue with this and was unable to complete all 4 sets of tricep ext to the full 15 reps. Lowered weight for pt and she was able to complete 10 reps with deflation in BP cuff beforehand. Pt continues to benefit from skilled PT in order to keep progressing towards their goals.       Plan   Continue current POC, progress as tolerated

## 2025-04-14 NOTE — PROGRESS NOTES
Subjective   Patient ID: Eli Matthew is a 64 y.o. female. 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 Pt felt fine yesterday. Woke up this morning with right eye pain. Eye feels scratchy. Watery. Pt flushed eye today).    History of Present Illness    Injury   64-year-old patient presents to clinic with complaints of right eye irritation/foreign body sensation  since this morning after power washing break yesterday.   Reports has tried systane topical ointment, Visine eyedrops, flushing the area.   Reports has a history of shingles of the right eye which caused  abnormal vision in the right eye. Denies fevers, chills, body aches, eye discharge, vision changes,  dizziness, headaches, cough, sore throat, ear pain.      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 She has never used smokeless tobacco. She reports current alcohol use. She reports that she does not use drugs.    Review of Systems  Review of Systems       ROS negative with the exception as noted on HPI                         Objective    Vitals:    04/14/25 1349   BP: 119/66   BP Location: Right arm   Patient Position: Sitting   BP Cuff Size: Large adult   Pulse: 51   Resp: 16   Temp: 36.7 °C (98 °F)   TempSrc: Oral   SpO2: 98%   Weight:  "54.9 kg (121 lb)   Height: 1.6 m (5' 3\")     No LMP recorded. Patient is postmenopausal.    Physical Exam  Constitutional:       Appearance: Normal appearance.   HENT:      Head: Normocephalic and atraumatic.      Right Ear: Tympanic membrane, ear canal and external ear normal.      Left Ear: Tympanic membrane, ear canal and external ear normal.      Nose: No mucosal edema or rhinorrhea.      Right Sinus: No maxillary sinus tenderness or frontal sinus tenderness.      Left Sinus: No maxillary sinus tenderness or frontal sinus tenderness.      Mouth/Throat:      Lips: Pink.      Mouth: Mucous membranes are moist. No oral lesions.      Dentition: Normal dentition. No gingival swelling.      Tongue: No lesions. Tongue does not deviate from midline.      Palate: No mass and lesions.      Pharynx: No pharyngeal swelling, posterior oropharyngeal erythema, uvula swelling or postnasal drip.   Eyes:      General: Lids are normal. Vision grossly intact.         Right eye: No foreign body, discharge or hordeolum.         Left eye: No foreign body, discharge or hordeolum.      Extraocular Movements:      Right eye: Normal extraocular motion.      Left eye: Normal extraocular motion.      Conjunctiva/sclera:      Right eye: Right conjunctiva is not injected. No chemosis.     Left eye: Left conjunctiva is not injected. No chemosis.     Comments: No corneal abrasion noted on the right after   Fluorescein stain   Cardiovascular:      Rate and Rhythm: Normal rate and regular rhythm.      Heart sounds: No murmur heard.  Pulmonary:      Effort: Pulmonary effort is normal. No respiratory distress.      Breath sounds: Normal breath sounds. No stridor. No wheezing, rhonchi or rales.   Lymphadenopathy:      Cervical: No cervical adenopathy.   Neurological:      Mental Status: She is alert.         Procedures    Point of Care Test & Imaging Results from this visit  No results found for this visit on 04/14/25.   Imaging  No results " found.    Cardiology, Vascular, and Other Imaging  No other imaging results found for the past 2 days      Diagnostic study results (if any) were reviewed by Fanta Lomas PA-C.    Assessment/Plan   Allergies, medications, history, and pertinent labs/EKGs/Imaging reviewed by Fanta Lomas PA-C.   right eye irritation/foreign body sensation  since this morning after power washing break yesterday.   NSAID eyedrops started for pain.  Advised may use hydrating eyedrops for comfort over-the-counter.  Avoid scratching or rubbing the eye.    Monitor for vision changes, worsening eye pain, continued light sensitivity after a few days, fevers, chills, swelling, redness, warmth, pain around the eye, purulent drainage from the eye, severe headaches, dizziness and if these occur proceed to the ED.   Medical Decision Making      Orders and Diagnoses  There are no diagnoses linked to this encounter.    Medical Admin Record      Patient disposition: Home    Electronically signed by Fanta Lomas PA-C  1:57 PM

## 2025-04-17 ENCOUNTER — APPOINTMENT (OUTPATIENT)
Dept: OPHTHALMOLOGY | Age: 65
End: 2025-04-17
Payer: COMMERCIAL

## 2025-04-21 ENCOUNTER — TREATMENT (OUTPATIENT)
Dept: PHYSICAL THERAPY | Facility: CLINIC | Age: 65
End: 2025-04-21
Payer: COMMERCIAL

## 2025-04-21 DIAGNOSIS — M25.512 ACUTE PAIN OF LEFT SHOULDER: Primary | ICD-10-CM

## 2025-04-21 PROCEDURE — 97110 THERAPEUTIC EXERCISES: CPT | Mod: GP

## 2025-04-21 PROCEDURE — 97140 MANUAL THERAPY 1/> REGIONS: CPT | Mod: GP

## 2025-04-21 NOTE — PROGRESS NOTES
Physical Therapy Treatment    Patient Name: Eli Matthew  MRN: [id number]  Time Calculation  Start Time: 1030  Stop Time: 1108  Time Calculation (min): 38 min     PT Therapeutic Procedures Time Entry  Manual Therapy Time Entry: 8  Therapeutic Exercise Time Entry: 30                   Insurance:   Visit number: 15/16  ANTHEM 30V ( 28 REMAIN PT ) COPAY 0 DED 5000(4458),COVERAGE 80 OOP 0 NO AUTH REQ,     Current Problem  1. Acute pain of left shoulder            Subjective   General  Pt reports no significant changes.    Precautions  5-10 #     Pain  0/10    Objective     Treatments:  UBE 2/2 fwd/bwd     Manual: PROM to GHJ to pt tolerance - 10 mins     Suitcase carry 12# 4x laps  Forward pull downs x 25 7.5#   Rows 10# x 25   Low row/upright row 7.5# x 25  Upright punch plyo 2.5 x 25   Snow angels green TB 2 x 8   IR with reach green Tb x 25   ER green TB x 25   Wall push ups x 25         Assessment   Pt able to tolerate progressed resistance and functional strengthening today. Reported mm fatigue after multiple exercises but able to complete each one. Has good awareness of how much resistance she can tolerate. Pt continues to benefit from skilled PT in order to keep progressing towards their goals.       Plan   Continue current POC, progress as tolerated

## 2025-04-28 ENCOUNTER — TREATMENT (OUTPATIENT)
Dept: PHYSICAL THERAPY | Facility: CLINIC | Age: 65
End: 2025-04-28
Payer: COMMERCIAL

## 2025-04-28 DIAGNOSIS — M25.512 ACUTE PAIN OF LEFT SHOULDER: Primary | ICD-10-CM

## 2025-04-28 PROCEDURE — 97535 SELF CARE MNGMENT TRAINING: CPT | Mod: GP

## 2025-04-28 NOTE — PROGRESS NOTES
Physical Therapy Re-Evaluation    Patient Name: Eli Matthew  MRN: [id number]  Time Calculation  Start Time: 1042  Stop Time: 1100  Time Calculation (min): 18 min     PT Therapeutic Procedures Time Entry  Self-Care/Home Mgmt Trainin                   Current Problem  1. Acute pain of left shoulder          Insurance:   Visit number:  out of 30 for the year   ANTHEM 30V COPAY 0 DED 5000([id number]),COVERAGE 80 OOP 0 NO AUTH REQ,       Subjective   General:  Pt is here for a re-evaluation of her L shoulder following a  left shoulder arthroscopy with SLAP repair, biceps tenodesis, subacromial decompression, rotator cuff repair on 24 with Dr. Robles. Pt states not being too limited in everyday activities but ROM is still not as quite symmetrical compared to contralateral arm. She states some soreness after heavier or longer periods of activity.     Precautions:  5-10 #     Pain:  0/10 current   1/10 at worst     Reviewed medical screening form with pt and medical screening assessed    Imaging:     Objective     Shoulder Musculoskeletal Exam    Inspection    Left      Incision: well-healed      Incisional drainage: none    Palpation    Left      Tenderness: none    Range of Motion    Right      Active ROM: normal and no pain.       Passive ROM: normal and no pain.       Internal rotation: mid thoracic.     Left      Active ROM: no pain.       Passive ROM: no pain.       Forward elevation: 172.       Passive forward elevation: 178.       Left shoulder active abduction: 178.       Passive abduction: 140.       External rotation 90 degrees: F ER normal.       Left passive external rotation 90 degrees: 65.       Passive internal rotation in abduction: 80.       Internal rotation: lumbar.     Strength    Right      Right shoulder strength is normal.     Left      External rotation: 4+/5.       Internal rotation: 5/5.       Abduction: 4/5.       Biceps: 5/5.       Triceps: 5/5.     Strength additional comments: L  deltoid: 5/5       Outcome Measures:  Other Measures  Disability of Arm Shoulder Hand (DASH): 7     Treatment:   Includes measures for re-eval     EDUCATION/HEP:  Continue current HEP independently to maintain Sx     Assessment:  Pt is here for an evaluation of  re-evaluation of her L shoulder following a  left shoulder arthroscopy with SLAP repair, biceps tenodesis, subacromial decompression, rotator cuff repair on 12/6/24 with Dr. Robles. Pt demonstrates  WFL ROM for L shoulder actively; flexion and ER is symmetrical to contralateral arm, she is slightly limited with IR vs RUE  . Strength is WFL and some weakness displayed with ER and abduction, however this is pt's non-dominant arm; no pain noted in MMT. Pt is going continue to work on ROM and strength on her own with HEP until her 1 month follow up with me and ortho. As of today's recheck, all shoulder goals met.       Clinical Presentation: Stable     Level of Complexity: Low     Goals:  Pt will report at least 75% improvement in L shoulder pain during everyday activities. MET   Pt will demo >/= 4+/5 strength of L shoulder musculature without pain. MET   Pt will demo full and symmetrical AROM of cervical spine without pain. MET  Pt will demo symmetrical A/PROM of L shoulder to  without pain. MET  Pt will improve Quick DASH score by at least 20% (MCID) to indicate a significant improvement in overall function. MET  Pt will demo independence and report compliance with HEP. MET    Plan  Follow up in 1 month, pt will continue progressing ROM and strength independently with HEP      Skilled therapeutic intervention to address the above mentioned physical and functional impairments and limitations including, but not limited to: patient education, therapeutic exercise, therapeutic activity, manual therapy, body mechanics training, dry needling, blood flow restriction training, instrumented soft tissue mobilization, manual soft tissue mobilization, gait retraining,  biofeedback, cryotherapy, electrical stimulation, home program development, hot pack, taping, neuromuscular re-education, self-care/home management, and vasopneumatic compression.

## 2025-06-03 ENCOUNTER — OFFICE VISIT (OUTPATIENT)
Dept: ORTHOPEDIC SURGERY | Facility: CLINIC | Age: 65
End: 2025-06-03
Payer: COMMERCIAL

## 2025-06-03 ENCOUNTER — TREATMENT (OUTPATIENT)
Dept: PHYSICAL THERAPY | Facility: CLINIC | Age: 65
End: 2025-06-03
Payer: COMMERCIAL

## 2025-06-03 DIAGNOSIS — M25.512 LEFT SHOULDER PAIN, UNSPECIFIED CHRONICITY: Primary | ICD-10-CM

## 2025-06-03 DIAGNOSIS — M25.512 LEFT SHOULDER PAIN: Primary | ICD-10-CM

## 2025-06-03 PROCEDURE — 99212 OFFICE O/P EST SF 10 MIN: CPT | Performed by: ORTHOPAEDIC SURGERY

## 2025-06-03 PROCEDURE — 97535 SELF CARE MNGMENT TRAINING: CPT | Mod: GP

## 2025-06-03 PROCEDURE — 1036F TOBACCO NON-USER: CPT | Performed by: ORTHOPAEDIC SURGERY

## 2025-06-03 PROCEDURE — 99213 OFFICE O/P EST LOW 20 MIN: CPT | Performed by: ORTHOPAEDIC SURGERY

## 2025-06-03 NOTE — PROGRESS NOTES
Physical Therapy Re-Evaluation & Discharge     Patient Name: Eli Matthew  MRN: [id number]  Time Calculation  Start Time: 1000  Stop Time: 1015  Time Calculation (min): 15 min     PT Therapeutic Procedures Time Entry  Self-Care/Home Mgmt Training: 15                   Current Problem  1. Left shoulder pain          Insurance:   Visit number: 17/17out of 30 for the year   ANTHEM 30V COPAY 0 DED 5000([id number]),COVERAGE 80 OOP 0 NO AUTH REQ,       Subjective   General:  Pt here for 1 month follow up after L shoulder following a left shoulder arthroscopy with SLAP repair, biceps tenodesis, subacromial decompression, rotator cuff repair on 12/6/24 with Dr. Robles. Surgery was 6 months ago and her L hand is her non-dominant. She has been on a home exercise program since then. She also had f/u with ortho and they had no lasting concerns. He did state to not perform heavy overhead lifting. Pt does not have any lasting concerns or limitations either. No episodes of pain since last recheck.     Precautions:  5-10 #     Pain:  Current - 0/10  At worst -0/10  Reviewed medical screening form with pt and medical screening assessed    Imaging:     Objective     Shoulder Musculoskeletal Exam    Inspection    Left      Incision: well-healed      Incisional drainage: none    Palpation    Left      Tenderness: none    Range of Motion    Right      Active ROM: normal and no pain.       Passive ROM: normal and no pain.       Internal rotation: mid thoracic.     Left      Active ROM: no pain.       Passive ROM: no pain.       Forward elevation: 168.       Passive forward elevation: 178.       Left shoulder active abduction: 178.       Passive abduction: 140.       External rotation 90 degrees: F ER normal.       Left passive external rotation 90 degrees: 65.       Passive internal rotation in abduction: 80.       Internal rotation: lumbar.     Strength    Right      Right shoulder strength is normal.     Left      External rotation: 4+/5.        Internal rotation: 5/5.       Abduction: 5/5.       Biceps: 5/5.       Triceps: 5/5.     Strength additional comments: L deltoid: 5/5       Outcome Measures:  Other Measures  Disability of Arm Shoulder Hand (DASH): 5     Treatment:   Includes measures for recheck     EDUCATION/HEP:  Continue current HEP independently to maintain Sx     Assessment:  Pt is here for an evaluation of  re-evaluation of her L shoulder following a  left shoulder arthroscopy with SLAP repair, biceps tenodesis, subacromial decompression, rotator cuff repair on 12/6/24 with Dr. Robles. Pt demonstrates WFL ROM for L shoulder actively; flexion and ER is symmetrical to contralateral arm, she is slightly limited with IR vs RUE . Strength is WFL for L shoulder as this is her non-dominant arm; no pain noted in MMT. Pt no longer stands to benefit from skilled PT d/t L shoulder goals met.     Clinical Presentation: Stable     Level of Complexity: Low    Goals:  Pt will report at least 75% improvement in L shoulder pain during everyday activities. MET   Pt will demo >/= 4+/5 strength of L shoulder musculature without pain. MET   Pt will demo full and symmetrical AROM of cervical spine without pain. MET  Pt will demo symmetrical A/PROM of L shoulder to  without pain. MET  Pt will improve Quick DASH score by at least 20% (MCID) to indicate a significant improvement in overall function. MET  Pt will demo independence and report compliance with HEP. MET    Plan  Discharge from formal PT d/t L shoulder goals met     Skilled therapeutic intervention to address the above mentioned physical and functional impairments and limitations including, but not limited to: patient education, therapeutic exercise, therapeutic activity, manual therapy, body mechanics training, dry needling, blood flow restriction training, instrumented soft tissue mobilization, manual soft tissue mobilization, gait retraining, biofeedback, cryotherapy, electrical stimulation, home  program development, hot pack, taping, neuromuscular re-education, self-care/home management, and vasopneumatic compression.

## 2025-06-03 NOTE — PROGRESS NOTES
History of Present Illness:  Patient returns today after shoulder arthroscopy doing well.  The patient endorses good relief of pre-operative symptoms and increasing activity level.     Physical Examination:  Well healed incisions  Forward flexion / External rotation similar to contralateral side, slight decrease IR  No significant deficits in rotator cuff strength testing  Neurovascular exam normal distally    Assessment:  Patient status post left shoulder arthroscopy biceps tenodesis, rotator cuff repair    Plan:  Discussed home exercise program and activities to avoid.  Discussed return to activities.  Reviewed NSAIDS for occasional pain, discussed the risks and benefits.  Follow-up: Not needed at this time

## 2025-06-04 PROBLEM — H53.71 GLARE SENSITIVITY: Status: RESOLVED | Noted: 2023-02-24 | Resolved: 2025-06-04

## 2025-06-04 PROBLEM — D48.5 NEOPLASM OF UNCERTAIN BEHAVIOR OF SKIN: Status: RESOLVED | Noted: 2018-07-19 | Resolved: 2025-06-04

## 2025-06-04 PROBLEM — L82.1 OTHER SEBORRHEIC KERATOSIS: Status: RESOLVED | Noted: 2018-07-19 | Resolved: 2025-06-04

## 2025-06-04 PROBLEM — H01.009 BLEPHARITIS WITH ROSACEA: Status: RESOLVED | Noted: 2023-02-24 | Resolved: 2025-06-04

## 2025-06-04 PROBLEM — E66.3 OVERWEIGHT WITH BODY MASS INDEX (BMI) OF 27 TO 27.9 IN ADULT: Status: RESOLVED | Noted: 2023-02-24 | Resolved: 2025-06-04

## 2025-06-04 PROBLEM — B02.33 HERPES ZOSTER KERATITIS OF RIGHT EYE: Status: RESOLVED | Noted: 2023-02-24 | Resolved: 2025-06-04

## 2025-06-04 PROBLEM — R06.02 SHORTNESS OF BREATH AT REST: Status: RESOLVED | Noted: 2022-04-25 | Resolved: 2025-06-04

## 2025-06-04 PROBLEM — D22.5 MELANOCYTIC NEVI OF TRUNK: Status: RESOLVED | Noted: 2018-07-19 | Resolved: 2025-06-04

## 2025-06-04 PROBLEM — L71.9 BLEPHARITIS WITH ROSACEA: Status: RESOLVED | Noted: 2023-02-24 | Resolved: 2025-06-04

## 2025-06-04 PROBLEM — L81.4 OTHER MELANIN HYPERPIGMENTATION: Status: RESOLVED | Noted: 2018-07-19 | Resolved: 2025-06-04

## 2025-06-04 PROBLEM — R45.89 EMOTIONAL SENSITIVITY: Status: RESOLVED | Noted: 2024-10-17 | Resolved: 2025-06-04

## 2025-06-04 PROBLEM — J20.9 ACUTE BRONCHITIS WITH BRONCHOSPASM: Status: RESOLVED | Noted: 2024-10-17 | Resolved: 2025-06-04

## 2025-06-04 PROBLEM — Z98.890 S/P LASIK (LASER ASSISTED IN SITU KERATOMILEUSIS): Status: RESOLVED | Noted: 2023-02-24 | Resolved: 2025-06-04

## 2025-06-06 ENCOUNTER — APPOINTMENT (OUTPATIENT)
Dept: PRIMARY CARE | Facility: CLINIC | Age: 65
End: 2025-06-06
Payer: COMMERCIAL

## 2025-06-06 VITALS
WEIGHT: 126 LBS | SYSTOLIC BLOOD PRESSURE: 110 MMHG | RESPIRATION RATE: 16 BRPM | HEART RATE: 59 BPM | TEMPERATURE: 97.7 F | DIASTOLIC BLOOD PRESSURE: 70 MMHG | BODY MASS INDEX: 22.32 KG/M2 | OXYGEN SATURATION: 99 %

## 2025-06-06 DIAGNOSIS — J67.0: ICD-10-CM

## 2025-06-06 DIAGNOSIS — K92.1 HEMATOCHEZIA: ICD-10-CM

## 2025-06-06 DIAGNOSIS — E78.2 MIXED HYPERLIPIDEMIA: ICD-10-CM

## 2025-06-06 DIAGNOSIS — M25.512 LEFT SHOULDER PAIN, UNSPECIFIED CHRONICITY: Primary | ICD-10-CM

## 2025-06-06 PROCEDURE — 1036F TOBACCO NON-USER: CPT | Performed by: INTERNAL MEDICINE

## 2025-06-06 PROCEDURE — 99214 OFFICE O/P EST MOD 30 MIN: CPT | Performed by: INTERNAL MEDICINE

## 2025-06-06 ASSESSMENT — PATIENT HEALTH QUESTIONNAIRE - PHQ9
2. FEELING DOWN, DEPRESSED OR HOPELESS: NOT AT ALL
SUM OF ALL RESPONSES TO PHQ9 QUESTIONS 1 AND 2: 0
1. LITTLE INTEREST OR PLEASURE IN DOING THINGS: NOT AT ALL

## 2025-06-06 ASSESSMENT — ENCOUNTER SYMPTOMS
BLOOD IN STOOL: 0
ANAL BLEEDING: 1

## 2025-06-19 ENCOUNTER — APPOINTMENT (OUTPATIENT)
Dept: CARDIOLOGY | Facility: HOSPITAL | Age: 65
End: 2025-06-19
Payer: COMMERCIAL

## 2025-07-01 ENCOUNTER — OFFICE VISIT (OUTPATIENT)
Dept: CARDIOLOGY | Facility: CLINIC | Age: 65
End: 2025-07-01
Payer: COMMERCIAL

## 2025-07-01 VITALS
BODY MASS INDEX: 21.62 KG/M2 | SYSTOLIC BLOOD PRESSURE: 132 MMHG | OXYGEN SATURATION: 96 % | DIASTOLIC BLOOD PRESSURE: 80 MMHG | HEIGHT: 63 IN | HEART RATE: 69 BPM | WEIGHT: 122 LBS

## 2025-07-01 DIAGNOSIS — R06.02 SHORTNESS OF BREATH: Primary | ICD-10-CM

## 2025-07-01 DIAGNOSIS — E78.2 MIXED HYPERLIPIDEMIA: Chronic | ICD-10-CM

## 2025-07-01 PROBLEM — K92.2 GIB (GASTROINTESTINAL BLEEDING): Chronic | Status: ACTIVE | Noted: 2025-07-01

## 2025-07-01 PROCEDURE — 1036F TOBACCO NON-USER: CPT | Performed by: INTERNAL MEDICINE

## 2025-07-01 PROCEDURE — 3008F BODY MASS INDEX DOCD: CPT | Performed by: INTERNAL MEDICINE

## 2025-07-01 PROCEDURE — 99214 OFFICE O/P EST MOD 30 MIN: CPT | Performed by: INTERNAL MEDICINE

## 2025-07-01 PROCEDURE — 99212 OFFICE O/P EST SF 10 MIN: CPT

## 2025-07-01 NOTE — PROGRESS NOTES
Referred by Kimberly HUYNH I am seeing Eli to establish care with a new cardiologist.  She has a strong family history of premature coronary disease with her younger sister having stents placed in the age of 56 and her older sister having heart attack.  Seen by  last in December.  Calcium score was 0.  Stress test revealed excellent functional capacity for age with normal perfusion.  Echo had an abnormal inferior wall but otherwise contractility was normal.  She has been on statins and aspirin for strong family history.  LDL 77. She feels well.   Past Medical History:  Problem List Items Addressed This Visit    None     Medical History[1]      Past Surgical History:  Surgical History[2]     Social History:  She reports that she has never smoked. She has never used smokeless tobacco. She reports current alcohol use. She reports that she does not use drugs.  Family History:  Family History[3]   Allergies:  Sulfa (sulfonamide antibiotics)  Outpatient Medications:  Current Outpatient Medications   Medication Instructions    atorvastatin (LIPITOR) 20 mg, oral, Daily    calcium 500 mg calcium (1,250 mg) tablet Take by mouth.    estradiol (Vagifem) 10 mcg tablet vaginal tablet Re-new start, 1 tablet every night for 2 weeks then 1 tablet twice a week    fish oil concentrate (Omega-3) 120-180 mg capsule Take by mouth.    glucosamine/chondroitin/C/Moise (glucosamine-chondroit-vit C-Mn) 856-471-11-5 mg tablet Take by mouth.    metoprolol succinate XL (TOPROL-XL) 12.5 mg, oral, Daily, Do not crush or chew.    multivitamin with minerals (MULTIPLE VITAMIN-MINERALS ORAL) Take by mouth.      Last Recorded Vitals:  There were no vitals filed for this visit.  Physical Exam  Patient is alert and oriented x3.  HEENT is unremarkable mucous members are moist  Neck no JVP no bruits upstrokes are full no thyromegaly  Lungs are clear bilaterally.  No wheezing crackles or rales  Heart regular rhythm normal S1-S2 there is no S3 no  murmurs are heard.  Abdomen is soft. BS are positive nontender nondistended no organomegaly no pulsatile masses  Extremities have no edema.  Distal pulses present palpable.  Neuro is grossly nonfocal  Skin has no rashes   Last Labs:  CBC -  Lab Results   Component Value Date    WBC 9.4 01/21/2025    HGB 13.1 01/21/2025    HCT 38.0 01/21/2025    MCV 91 01/21/2025     01/21/2025    CREATININE 0.69 01/21/2025    BUN 6 01/21/2025     CMP -  Lab Results   Component Value Date    CALCIUM 9.5 01/21/2025    PROT 7.0 11/07/2024    ALBUMIN 4.4 11/07/2024    AST 62 (H) 11/07/2024    ALT 27 11/07/2024    ALKPHOS 47 11/07/2024    BILITOT 0.8 11/07/2024     LIPID PANEL -   Lab Results   Component Value Date    CHOL 150 10/21/2024    HDL 61.1 10/21/2024    CHHDL 2.5 10/21/2024    VLDL 12 10/21/2024    TRIG 62 10/21/2024    NHDL 89 10/21/2024    LDLF 143 (H) 03/15/2023     RENAL FUNCTION PANEL -   Lab Results   Component Value Date    K 4.2 01/21/2025     Lab Results   Component Value Date    BNP 19 04/24/2022     Procedures    TST 11/18/2024 excellent functional capacity (11 minutes, 13.4 METS).  No EKG changes no symptoms normal perfusion EF 78%    TTE 11/7/2024Hypokinesis basal to mid inferior wall EF 55 to 60%    CAC 9/27/2024 0         Assessment/Plan   1.  Family history of coronary disease.  She herself has a calcium score of 0.  She has excellent functional capacity on a stress test with normal perfusion.  She is on statins.  No cardiac symptoms.    2.  Hyperlipidemia.  She is on atorvastatin 20 mg.  LDL 77. Followed with Dr. Harris    3.  Shortness of breath.  This is only with vigorous exertion in [place] hiking 5000 feet elevation.  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  She sees a pulmonologist for this.    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

## 2025-07-01 NOTE — PATIENT INSTRUCTIONS
1.  Family history of coronary disease.  She herself has a calcium score of 0.  She has excellent functional capacity on a stress test with normal perfusion.  She is on statins.  No cardiac symptoms.    2.  Hyperlipidemia.  She is on atorvastatin 20 mg.  LDL 77. Followed with Dr. Harris    3.  Shortness of breath.  This is only with vigorous exertion in [place] hiking 5000 feet elevation.  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  She sees a pulmonologist for this.    RTC 1 year

## 2025-07-29 ENCOUNTER — APPOINTMENT (OUTPATIENT)
Dept: OBSTETRICS AND GYNECOLOGY | Facility: CLINIC | Age: 65
End: 2025-07-29
Payer: COMMERCIAL

## 2025-07-30 NOTE — PROGRESS NOTES
"65-year-old  Patient is a pharmacist  Planning to retire to second home in [place]    Concerned about a very small \"skin tag\" she found a month ago while cleaning.  It is still present  No pain or erythema  She and her partner are not frequently sexually active because she is still bothered by vaginal dryness despite Vagifem    Last gyn: 3/25/2025 with me  Pap: 1/30/2024 unremarkable with cotesting; patient may opt out of Pap smear screening going forward  Mammogram: 8/1/2024 unremarkable  Colonoscopy: Flexible sigmoidoscopy 2024     Postmenopausal 2014, no bleeding    CONSTITUTIONAL: Alert and in no acute distress. Well developed, well nourished.   HEAD AND FACE: Head and face: Normal.   EYES: Normal external exam - nonicteric sclera, extraocular movements intact (EOMI) and no ptosis.   EARS, NOSE, MOUTH, AND THROAT: External inspection of ears and nose: Normal.     GENITOURINARY: External genitalia: Normal. No inguinal lymphadenopathy. Bartholin's Urethral and Skenes Glands: Normal.     Left labia less than 1 cm likely sebaceous cyst which is nontender     inspection of Perianal Area: Normal.   MUSCULOSKELETAL: No joint swelling seen, normal movements of all extremities.   SKIN: Normal skin color and pigmentation, normal skin turgor, and no rash.   NEUROLOGIC: Non-focal. Grossly intact.        PSYCHIATRIC: Alert and oriented x 3. Affect normal to patient baseline. Mood: Appropriate.     "

## 2025-08-05 ENCOUNTER — APPOINTMENT (OUTPATIENT)
Dept: OBSTETRICS AND GYNECOLOGY | Facility: CLINIC | Age: 65
End: 2025-08-05
Payer: COMMERCIAL

## 2025-08-05 VITALS
DIASTOLIC BLOOD PRESSURE: 60 MMHG | SYSTOLIC BLOOD PRESSURE: 124 MMHG | HEIGHT: 63 IN | WEIGHT: 126 LBS | BODY MASS INDEX: 22.32 KG/M2

## 2025-08-05 DIAGNOSIS — N90.7 SEBACEOUS CYST OF LABIA: Primary | ICD-10-CM

## 2025-08-05 PROCEDURE — 1036F TOBACCO NON-USER: CPT | Performed by: OBSTETRICS & GYNECOLOGY

## 2025-08-05 PROCEDURE — 99213 OFFICE O/P EST LOW 20 MIN: CPT | Performed by: OBSTETRICS & GYNECOLOGY

## 2025-08-05 PROCEDURE — 3008F BODY MASS INDEX DOCD: CPT | Performed by: OBSTETRICS & GYNECOLOGY

## 2025-08-05 ASSESSMENT — PAIN SCALES - GENERAL: PAINLEVEL_OUTOF10: 0-NO PAIN

## 2025-08-08 ENCOUNTER — HOSPITAL ENCOUNTER (OUTPATIENT)
Dept: RADIOLOGY | Facility: CLINIC | Age: 65
Discharge: HOME | End: 2025-08-08
Payer: COMMERCIAL

## 2025-08-08 VITALS — BODY MASS INDEX: 22.34 KG/M2 | HEIGHT: 63 IN | WEIGHT: 126.1 LBS

## 2025-08-08 DIAGNOSIS — Z12.31 BREAST CANCER SCREENING BY MAMMOGRAM: ICD-10-CM

## 2025-08-08 PROCEDURE — 77067 SCR MAMMO BI INCL CAD: CPT

## 2025-12-12 ENCOUNTER — APPOINTMENT (OUTPATIENT)
Dept: PRIMARY CARE | Facility: CLINIC | Age: 65
End: 2025-12-12
Payer: COMMERCIAL

## 2026-03-31 ENCOUNTER — APPOINTMENT (OUTPATIENT)
Dept: OBSTETRICS AND GYNECOLOGY | Facility: CLINIC | Age: 66
End: 2026-03-31
Payer: COMMERCIAL